# Patient Record
Sex: FEMALE | Race: WHITE | NOT HISPANIC OR LATINO | Employment: OTHER | URBAN - METROPOLITAN AREA
[De-identification: names, ages, dates, MRNs, and addresses within clinical notes are randomized per-mention and may not be internally consistent; named-entity substitution may affect disease eponyms.]

---

## 2022-03-07 ENCOUNTER — OFFICE VISIT (OUTPATIENT)
Dept: FAMILY MEDICINE CLINIC | Facility: CLINIC | Age: 62
End: 2022-03-07

## 2022-03-07 ENCOUNTER — PATIENT ROUNDING (BHMG ONLY) (OUTPATIENT)
Dept: FAMILY MEDICINE CLINIC | Facility: CLINIC | Age: 62
End: 2022-03-07

## 2022-03-07 VITALS
HEIGHT: 65 IN | WEIGHT: 198.8 LBS | OXYGEN SATURATION: 96 % | DIASTOLIC BLOOD PRESSURE: 100 MMHG | RESPIRATION RATE: 16 BRPM | HEART RATE: 88 BPM | TEMPERATURE: 97.1 F | SYSTOLIC BLOOD PRESSURE: 150 MMHG | BODY MASS INDEX: 33.12 KG/M2

## 2022-03-07 DIAGNOSIS — Z86.69 HISTORY OF STATUS EPILEPTICUS: Primary | ICD-10-CM

## 2022-03-07 DIAGNOSIS — I10 PRIMARY HYPERTENSION: ICD-10-CM

## 2022-03-07 DIAGNOSIS — F41.8 DEPRESSION WITH ANXIETY: ICD-10-CM

## 2022-03-07 DIAGNOSIS — Z12.31 ENCOUNTER FOR SCREENING MAMMOGRAM FOR MALIGNANT NEOPLASM OF BREAST: ICD-10-CM

## 2022-03-07 DIAGNOSIS — F10.10 ALCOHOL ABUSE: ICD-10-CM

## 2022-03-07 DIAGNOSIS — G40.909 SEIZURE DISORDER: ICD-10-CM

## 2022-03-07 PROBLEM — R41.89 COGNITIVE IMPAIRMENT: Status: ACTIVE | Noted: 2022-02-12

## 2022-03-07 PROBLEM — F33.9 EPISODE OF RECURRENT MAJOR DEPRESSIVE DISORDER: Status: ACTIVE | Noted: 2022-02-12

## 2022-03-07 PROBLEM — F41.9 ANXIETY: Status: ACTIVE | Noted: 2022-02-18

## 2022-03-07 PROBLEM — F32.A DEPRESSION: Status: ACTIVE | Noted: 2022-02-18

## 2022-03-07 PROCEDURE — 99204 OFFICE O/P NEW MOD 45 MIN: CPT | Performed by: NURSE PRACTITIONER

## 2022-03-07 RX ORDER — MULTIPLE VITAMINS W/ MINERALS TAB 9MG-400MCG
1 TAB ORAL DAILY
COMMUNITY
Start: 2022-02-19 | End: 2022-04-04

## 2022-03-07 RX ORDER — LOSARTAN POTASSIUM 50 MG/1
50 TABLET ORAL DAILY
Qty: 30 TABLET | Refills: 1 | Status: SHIPPED | OUTPATIENT
Start: 2022-03-07 | End: 2022-05-06

## 2022-03-07 RX ORDER — LANOLIN ALCOHOL/MO/W.PET/CERES
3 CREAM (GRAM) TOPICAL
COMMUNITY

## 2022-03-07 RX ORDER — LEVETIRACETAM 750 MG/1
TABLET ORAL
COMMUNITY
Start: 2022-02-18 | End: 2023-02-03 | Stop reason: SDUPTHER

## 2022-03-07 RX ORDER — TRAZODONE HYDROCHLORIDE 50 MG/1
50 TABLET ORAL DAILY
COMMUNITY
Start: 2022-02-18 | End: 2022-04-04 | Stop reason: SDUPTHER

## 2022-03-07 RX ORDER — DULOXETIN HYDROCHLORIDE 30 MG/1
90 CAPSULE, DELAYED RELEASE ORAL DAILY
COMMUNITY
Start: 2022-02-19 | End: 2022-04-04 | Stop reason: SDUPTHER

## 2022-03-07 NOTE — EXTERNAL PATIENT INSTRUCTIONS
Patient Education   Table of Contents       Alcohol Abuse and Dependence Information, Adult       Alcohol Abuse and Nutrition       Alcohol Use Disorder       Epilepsy       How to Take Your Blood Pressure       Hypertension, Adult       Managing Anxiety, Adult       Managing Your Hypertension       Preventing Hypertension       Seizure, Adult     To view videos and all your education online visit,   [email or web address]   or scan this QR code with your smartphone.                  Alcohol Abuse and Dependence Information, Adult     Alcohol is a widely available drug. People drink alcohol in different amounts. People who drink alcohol very often and in large amounts often have problems during and after drinking. They may develop what is called an alcohol use disorder. There are two main types of alcohol use disorders:       Alcohol abuse. This is when you use alcohol too much or too often. You may use alcohol to make yourself feel happy or to reduce stress. You may have a hard time setting a limit on the amount you drink.       Alcohol dependence. This is when you use alcohol consistently for a period of time, and your body changes as a result. This can make it hard to stop drinking because you may start to feel sick or feel different when you do not use alcohol. These symptoms are known as withdrawal.     How can alcohol abuse and dependence affect me?    Alcohol abuse and dependence can have a negative effect on your life. Drinking too much can lead to addiction. You may feel like you need alcohol to function normally. You may drink alcohol before work in the morning, during the day, or as soon as you get home from work in the evening. These actions can result in:       Poor work performance.       Job loss.       Financial problems.       Car crashes or criminal charges from driving after drinking alcohol.       Problems in your relationships with friends and family.       Losing the trust and respect  of coworkers, friends, and family.      Drinking heavily over a long period of time can permanently damage your body and brain, and can cause lifelong health issues, such as:       Damage to your liver or pancreas.       Heart problems, high blood pressure, or stroke.       Certain cancers.       Decreased ability to fight infections.       Brain or nerve damage.       Depression.       Early (premature) death.     If you are careless or you crave alcohol, it is easy to drink more than your body can handle (overdose). Alcohol overdose is a serious situation that requires hospitalization. It may lead to permanent injuries or death.   What can increase my risk?         Having a family history of alcohol abuse.       Having depression or other mental health conditions.       Beginning to drink at an early age.       Binge drinking often.       Experiencing trauma, stress, and an unstable home life during childhood.       Spending time with people who drink often.     What actions can I take to prevent or manage alcohol abuse and dependence?        Do not  drink alcohol if:       Your health care provider tells you not to drink.       You are pregnant, may be pregnant, or are planning to become pregnant.      If you drink alcohol:      Limit how much you use to:       0?1 drink a day for women.       0?2 drinks a day for men.       Be aware of how much alcohol is in your drink. In the [place] one drink equals one 12 oz bottle of beer (355 mL), one 5 oz glass of wine (148 mL), or one 1? oz glass of hard liquor (44 mL).       Stop drinking if you have been drinking too much. This can be very hard to do if you are used to abusing alcohol. If you begin to have withdrawal symptoms, talk with your health care provider or a person that you trust. These symptoms may include anxiety, shaky hands, headache, nausea, sweating, or not being able to sleep.       Choose to drink nonalcoholic beverages in social gatherings and places  where there may be alcohol.     Activity         Spend more time on activities that you enjoy that do not involve alcohol, like hobbies or exercise.       Find healthy ways to cope with stress, such as exercise, meditation, or spending time with people you care about.     General information         Talk to your family, coworkers, and friends about supporting you in your efforts to stop drinking. If they drink, ask them not to drink around you. Spend more time with people who do not drink alcohol.      If you think that you have an alcohol dependency problem:       Tell friends or family about your concerns.       Talk with your health care provider or another health professional about where to get help.       Work with a therapist and a chemical dependency counselor.       Consider joining a support group for people who struggle with alcohol abuse and dependence.     Where to find support            Your health care provider.       [employer]: www.smartrecStringbike.org       Therapy and support groups         Local treatment centers or chemical dependency counselors.       Local AA groups in your community: www.aa.org         Where to find more information         Centers for Disease Control and Prevention: www.cdc.gov         National [place] on Alcohol Abuse and Alcoholism: www.niaaa.nih.gov         Alcoholics Anonymous (AA): www.aa.org       Contact a health care provider if:         You drank more or for longer than you intended on more than one occasion.       You tried to stop drinking or to cut back on how much you drink, but you were not able to.       You often drink to the point of vomiting or passing out.       You want to drink so badly that you cannot think about anything else.       You have problems in your life due to drinking, but you continue to drink.       You keep drinking even though you feel anxious, depressed, or have experienced memory loss.       You have stopped doing the things you  used to enjoy in order to drink.       You have to drink more than you used to in order to get the effect you want.       You experience anxiety, sweating, nausea, shakiness, and trouble sleeping when you try to stop drinking.     Get help right away if:         You have thoughts about hurting yourself or others.      You have serious withdrawal symptoms, including:       Confusion.       Racing heart.       High blood pressure.       Fever.     If you ever feel like you may hurt yourself or others, or have thoughts about taking your own life, get help right away. You can go to your nearest emergency department or call:      Your local emergency services (911 in the [place]      A suicide crisis helpline, such as the National Suicide Prevention Lifeline at 1-318.237.8391. This is open 24 hours a day.     Summary         Alcohol abuse and dependence can have a negative effect on your life. Drinking too much or too often can lead to addiction.       If you drink alcohol, limit how much you use.       If you are having trouble keeping your drinking under control, find ways to change your behavior. Hobbies, calming activities, exercise, or support groups can help.       If you feel you need help with changing your drinking habits, talk with your health care provider, a good friend, or a therapist, or go to an AA group.     This information is not intended to replace advice given to you by your health care provider. Make sure you discuss any questions you have with your health care provider.     Document Released: 12/12/2017Document Revised: 04/07/2020Document Reviewed: 02/25/2020     [employer] Patient Education ? 2021 [facility] [employer]         Alcohol Abuse and Nutrition     Alcohol abuse is any pattern of alcohol consumption that harms your health, relationships, or work. Alcohol abuse can cause poor nutrition (malnutrition or malnourishment) and a lack of nutrients (nutrient deficiencies), which can lead to more  complications. Alcohol abuse brings malnutrition and nutrient deficiencies in two ways:       It causes your liver to work abnormally. This affects how your body divides (breaks down) and absorbs nutrients from food.       It causes you to eat poorly. Many people who abuse alcohol do not eat enough carbohydrates, protein, fat, vitamins, and minerals.      Nutrients that are commonly lacking (deficient) in people who abuse alcohol include:      Vitamins.       Vitamin A. This is needed for your vision, metabolism, and ability to fight off infections (immunity).       B vitamins. These include folate, thiamine, and niacin. These are needed for new cell growth.       Vitamin C. This plays an important role in wound healing, immunity, and helping your body to absorb iron.       Vitamin D. This is necessary for your body to absorb and use calcium. It is produced by your liver, but you can also get it from food and from sun exposure.      Minerals.       Calcium. This is needed for healthy bones as well as heart and blood vessel (cardiovascular) function.       Iron. This is important for blood, muscle, and nervous system functioning.       Magnesium. This plays an important role in muscle and nerve function, and it helps to control blood sugar and blood pressure.       Zinc. This is important for the normal functioning of your nervous system and digestive system (gastrointestinal tract).     If you think that you have an alcohol dependency problem, or if it is hard to stop drinking because you feel sick or different when you do not use alcohol, talk with your health care provider or another health professional about where to get help.   Nutrition is an essential factor in therapy for alcohol abuse. Your health care provider or diet and nutrition specialist (dietitian) will work with you to design a plan that can help to restore nutrients to your body and prevent the risk of complications.   What is my plan?       Your  dietitian may develop a specific eating plan that is based on your condition and any other problems that you have. An eating plan will commonly include:      A balanced diet.       Grains: 6?8 oz (170?227 g) a day. Examples of 1 oz of whole grains include 1 cup of whole-wheat cereal, ? cup of brown rice, or 1 slice of whole-wheat bread.       Vegetables: 2?3 cups a day. Examples of 1 cup of vegetables include 2 medium carrots, 1 large tomato, or 2 stalks of celery.       Fruits: 1?2 cups a day. Examples of 1 cup of fruit include 1 large banana, 1 small apple, 8 large strawberries, or 1 large orange.      Meat and other protein: 5?6 oz (142?170 g) a day.       A cut of meat or fish that is the size of a deck of cards is about 3?4 oz.       Foods that provide 1 oz of protein include 1 egg, ? cup of nuts or seeds, or 1 tablespoon (16 g) of peanut butter.           Dairy: 2?3 cups a day. Examples of 1 cup of dairy include 8 oz (230 mL) of milk, 8 oz (230 g) of yogurt, or 1? oz (44 g) of natural cheese.         Vitamin and mineral supplements.     What are tips for following this plan?         Eat frequent meals and snacks. Try to eat 5?6 small meals each day.       Take vitamin or mineral supplements as recommended by your dietitian.      If you are malnourished or if your dietitian recommends it:      You may follow a high-protein, high-calorie diet. This may include:       2,000?3,000 calories (kilocalories) a day.       70?100 g (grams) of protein a day.           You may be directed to follow a diet that includes a complete nutritional supplement beverage. This can help to restore calories, protein, and vitamins to your body. Depending on your condition, you may be advised to consume this beverage instead of your meals or in addition to them.         Certain medicines may cause changes in your appetite, taste, and weight. Work with your health care provider and dietitian to make any changes to your medicines and  eating plan.       If you are unable to take in enough food and calories by mouth, your health care provider may recommend a feeding tube. This tube delivers nutritional supplements directly to your stomach.   Recommended foods         Eat foods that are high in molecules that prevent oxygen from reacting with your food (antioxidants). These foods include grapes, berries, nuts, green tea, and dark green or orange vegetables. Eating these can help to prevent some of the stress that is placed on your liver by consuming alcohol.       Eat a variety of fresh fruits and vegetables each day. This will help you to get fiber and vitamins in your diet.       Drink plenty of water and other clear fluids, such as apple juice and broth. Try to drink at least 48?64 oz (1.5?2 L) of water a day.       Include foods fortified with vitamins and minerals in your diet. Commonly fortified foods include milk, orange juice, cereal, and bread.       Eat a variety of foods that are high in omega-3 and omega-6 fatty acids. These include fish, nuts and seeds, and soybeans. These foods may help your liver to recover and may also stabilize your mood.      If you are a vegetarian:       Eat a variety of protein-rich foods.       Pair whole grains with plant-based proteins at meals and snack time. For example, eat rice with beans, put peanut butter on whole-grain toast, or eat oatmeal with sunflower seeds.     The items listed above may not be a complete list of foods and beverages you can eat. Contact a dietitian for more information.   Foods to avoid         Avoid foods and drinks that are high in fat and sugar. Sugary drinks, salty snacks, and candy contain empty calories. This means that they lack important nutrients such as protein, fiber, and vitamins.       Avoid alcohol. This is the best way to avoid malnutrition due to alcohol abuse. If you must drink, drink measured amounts. Measured drinking means limiting your intake to no more than  1 drink a day for nonpregnant women and 2 drinks a day for men. One drink equals 12 oz (355 mL) of beer, 5 oz (148 mL) of wine, or 1? oz (44 mL) of hard liquor.       Limit your intake of caffeine. Replace drinks like coffee and black tea with decaffeinated coffee and decaffeinated herbal tea.     The items listed above may not be a complete list of foods and beverages you should avoid. Contact a dietitian for more information.   Summary         Alcohol abuse can cause poor nutrition (malnutrition or malnourishment) and a lack of nutrients (nutrient deficiencies), which can lead to more health problems.       Common nutrient deficiencies include vitamin deficiencies (A, B, C, and D) and mineral deficiencies (calcium, iron, magnesium, and zinc).       Nutrition is an essential factor in therapy for alcohol abuse.       Your health care provider and dietitian can help you to develop a specific eating plan that includes a balanced diet plus vitamin and mineral supplements.     This information is not intended to replace advice given to you by your health care provider. Make sure you discuss any questions you have with your health care provider.     Document Released: 10/12/2006Document Revised: 04/07/2020Document Reviewed: 09/04/2018     [employer] Patient Education ? 2021 [employer]         Alcohol Use Disorder     Alcohol use disorder is a condition in which drinking disrupts daily life. People with this condition drink too much alcohol and cannot control their drinking.   Alcohol use disorder can cause serious problems with physical health. It can affect the brain, heart, and other internal organs. This disorder can raise the risk for certain cancers and cause problems with mental health, such as depression or anxiety.   What are the causes?   This condition is caused by drinking too much alcohol over time. Some people with this condition drink to cope with or escape from negative life events. Others drink to  relieve pain or symptoms of mental illness.   What increases the risk?    You are more likely to develop this condition if:       You have a family history of alcohol use disorder.       Your culture encourages drinking to the point of becoming drunk (intoxication).       You had a mood or conduct disorder in childhood.       You have been abused.      You are an adolescent and you:       Have poor performance in school.       Have poor supervision or guidance.       Act on impulse and like taking risks.     What are the signs or symptoms?    Symptoms of this condition include:       Drinking more than you want to.       Trying several times without success to drink less.       Spending a lot of time thinking about alcohol, getting alcohol, drinking, or recovering from drinking.       Continuing to drink even when it is causing serious problems in your daily life.       Drinking when it is dangerous to drink, such as before driving a car.       Needing more and more alcohol to get the same effect you want (building up tolerance).      Having symptoms of withdrawal when you stop drinking. Withdrawal symptoms may include:       Trouble sleeping, leading to tiredness (fatigue).       Mood swings of depression and anxiety.       Physical symptoms, such as a fast heart rate, rapid breathing, high blood pressure (hypertension), fever, cold sweats, or nausea.       Seizures.       Severe confusion.       Feeling or seeing things that are not there (hallucinations).       Shaking movements that you cannot control (tremors).     How is this diagnosed?   This condition is diagnosed with an assessment. Your health care provider may start by asking three or four questions about your drinking, or he or she may give you a simple test to take. This helps to get clear information from you.   You may also have a physical exam or lab tests. You may be referred to a substance abuse counselor.   How is this treated?       With  education, some people with alcohol use disorder are able to reduce their drinking. Many with this disorder cannot change their drinking behavior on their own and need help from substance use specialists. These specialists are counselors who can help diagnose how severe your disorder is and what type of treatment you need. Treatments may include:       Detoxification. Detoxification involves quitting drinking with supervision and direction of health care providers. Your health care provider may prescribe prescription medicines within the first week to help lessen withdrawal symptoms. Alcohol withdrawal can be dangerous and life-threatening. Detoxification may be provided in a home, community, or primary care setting, or in a hospital or substance use treatment facility.      Counseling. This may involve motivational interviewing (MI), family therapy, or cognitive behavioral therapy (CBT). It is provided by substance use treatment counselors or professional therapists. A counselor can address the things you can do to change your drinking behavior and how to maintain the changes. Talk therapy aims to:       Identify your positive motivations to change.       Identify and avoid the things that trigger your drinking.       Help you learn how to plan your behavior change.       Develop support systems that can help you sustain the change.      Medicines. Medicines can help treat this disorder by:       Decreasing cravings.       Decreasing the positive feeling you have when you drink.       Causing an uncomfortable physical reaction when you drink (aversion therapy).       [place] help groups such as Alcoholics Anonymous (AA). These groups are led by people who have quit drinking. The groups provide emotional support, advice, and guidance.     Some people with this condition benefit from a combination of treatments provided by specialized substance use treatment centers.     Follow these instructions at home:       Medicines         Take over-the-counter and prescription medicines only as told by your health care provider.       Ask before starting any new medicines, herbs, or supplements.     General instructions         Ask friends and family members to support your choice to stay sober.       Avoid situations where alcohol is served.       Create a plan to deal with tempting situations.       Attend support groups regularly.       Practice hobbies or activities you enjoy.      Do not  drink and drive.       Keep all follow-up visits as told by your health care provider. This is important.       How is this prevented?        If you drink alcohol:      Limit how much you use to:       0?1 drink a day for nonpregnant women.       0?2 drinks a day for men.           Be aware of how much alcohol is in your drink. In the [place] one drink equals one 12 oz bottle of beer (355 mL), one 5 oz glass of wine (148 mL), or one 1? oz glass of hard liquor (44 mL).        If you have a mental health condition, seek treatment. Develop a healthy lifestyle through:       Meditation or deep breathing.       Exercise.       Spending time in nature.       Listening to music.       Talking with a trusted friend or family member.      If you are an adolescent:      Do not  drink alcohol. Avoid gatherings where you might be tempted.      Do not  be afraid to say no if someone offers you alcohol. Speak up about why you do not want to drink. Set a positive example for others around you by not drinking.       Build relationships with friends who do not drink.   Where to find more information         Substance Abuse and Mental Health Services Administration: samhsa.gov         Alcoholics Anonymous: aa.org       Contact a health care provider if:         You cannot take your medicines as told.       Your symptoms get worse or you experience symptoms of withdrawal when you stop drinking.       You start drinking again (relapse) and your symptoms get  worse.     Get help right away if:         You have thoughts about hurting yourself or others.     If you ever feel like you may hurt yourself or others, or have thoughts about taking your own life, get help right away. Go to your nearest emergency department or:      Call your local emergency services (925 in the [place]      Call a suicide crisis helpline, such as the National Suicide Prevention Lifeline at 1-933.229.5421. This is open 24 hours a day in the [place]      Text the Crisis Text Line at 489557 (in the [place]     Summary         Alcohol use disorder is a condition in which drinking disrupts daily life. People with this condition drink too much alcohol and cannot control their drinking.       Treatment may include detoxification, counseling, medicines, and support groups.       Ask friends and family members to support you. Avoid situations where alcohol is served.       Get help right away if you have thoughts about hurting yourself or others.     This information is not intended to replace advice given to you by your health care provider. Make sure you discuss any questions you have with your health care provider.     Document Released: 01/25/2006Document Revised: 11/05/2020Document Reviewed: 11/05/2020     [employer] Patient Education ? 2021 [employer]         Epilepsy     Epilepsy is a condition in which a person has repeated seizures over time. A seizure is a sudden burst of abnormal electrical and chemical activity in the brain. Seizures can cause a change in attention, behavior, or ability to remain awake and alert (altered mental status).    Epilepsy increases a person's risk of falls, accidents, and injury. It can also lead to:       Depression.       Poor memory.       Sudden unexplained death in epilepsy (SUDEP). This is rare, and its cause is not known.     Most people with epilepsy lead normal lives.   What are the causes?    This condition may be caused by:       A head injury or injury that  happens at birth.       A high fever during childhood.       A stroke.       Bleeding into or around the brain.       Certain medicines and drugs.       Having too little oxygen for a long period of time.       Abnormal brain development.      Certain conditions. These may include:       Brain infection.       Brain tumor.       Conditions that are passed from parent to child (are hereditary).     Many times, the cause of this condition is not known.   What are the signs or symptoms?    Symptoms of a seizure vary greatly from person to person. They may include:       Uncontrollable shaking (convulsions) with fast, jerking movements of the arms or legs.       Stiffening of the body.       Breathing problems.       Confusion, staring, or unresponsiveness.       Head nodding, eye blinking or fluttering, or rapid eye movements.       Drooling, grunting, or making clicking sounds with your mouth.       Loss of bladder control and bowel control.      Some people have symptoms right before a seizure happens (aura) and right after a seizure happens. Symptoms of an aura include:       Fear or anxiety.       Nausea.      Vertigo. This is a feeling like:       You are moving when you are not.       Your surroundings are moving when they are not.       D?jà vu. This is a feeling of having seen or heard something before.       Odd tastes or smells.       Changes in vision, such as seeing flashing lights or spots.      Symptoms that follow a seizure include:       Confusion.       Sleepiness.       Headache.       Sore muscles.     How is this diagnosed?    This condition is diagnosed based on:       Your symptoms.       Your medical history.       A physical exam.       A neurological exam. This includes checking your strength, reflexes, coordination, and sensations.      Tests. These may include:       A painless test that records your brain waves (electroencephalogram, orEEG).       MRI.       CT scan.       A test of your  spinal fluid (lumbar puncture, or spinal tap).       Blood tests to check for signs of infection or abnormal blood chemistry.     How is this treated?   Treatment can control seizures. Some types of epilepsy will need lifelong treatment, and some types go away in time.    This condition may be treated with:       Medicines to control seizures and prevent future seizures.       A vagus nerve stimulator. This is a device that is implanted in the chest. The device sends electrical impulses to the vagus nerve and to the brain to prevent seizures. This treatment may be recommended if medicines do not help.       Brain surgery. There are several kinds of surgeries that may be done to stop seizures from happening or to reduce how often seizures happen.       Blood tests. You may need to have blood tests regularly to check that you are getting the right amount of medicine.       The ketogenic diet. This diet involves foods that are low in carbohydrates and high in fat.     When this condition has been diagnosed, it is important to begin treatment as soon as possible. For some people, epilepsy goes away in time.   Follow these instructions at home:   Medicines         Take over-the-counter and prescription medicines only as told by your health care provider.       Avoid any substances that may prevent your medicine from working properly, such as alcohol.     Activity         Get enough rest. Lack of sleep can make seizures more likely to happen.       Follow instructions from your health care provider about driving, swimming, and doing any other activities that would be dangerous if you had a seizure. If you live in the [place] check with your local department of motor vehicles (DMV) to find out about local driving laws. Each state has specific rules about when you can legally start driving again.     Educating others           Teach friends and family what to do if you have a seizure. They should:       Help you get down to  the ground to prevent a fall.       Cushion your head and body.       Loosen any tight clothing around your neck.       Turn you on your side. If vomiting occurs, this helps keep your airway clear.       Not hold you down. Holding you down will not stop the seizure.       Not put anything in your mouth.       Stay with you until you recover.       Know whether or not you need emergency care.     General instructions         Avoid anything that has ever triggered a seizure for you.       Keep a seizure diary. Record what you remember about each seizure, especially anything that might have triggered the seizure.       Keep all follow-up visits. This is important.       Where to find more information         Epilepsy Foundation: epilepsy.com         International League Against Epilepsy: ilae.org       Contact a health care provider if:         Your seizure pattern changes.       You continue to have seizures with treatment.       You have symptoms of an infection or illness. Either of these might increase your risk of having a seizure.       You are unable to take your medicine.     Get help right away if:        You have:       A seizure that does not stop after 5 minutes.       Several seizures in a row without a complete recovery between seizures.       A seizure that makes it harder to breathe.       A seizure that leaves you unable to speak or use a part of your body.       You did not wake up right away after a seizure.       You injure yourself during a seizure.       You have confusion or pain right after a seizure.     These symptoms may represent a serious problem that is an emergency. Do not wait to see if the symptoms will go away. Get medical help right away. Call your local emergency services (911 in the [place] Do not drive yourself to the hospital.   If you ever feel like you may hurt yourself or others, or have thoughts about taking your own life, get help right away. Go to your nearest emergency  department or:      Call your local emergency services (911 in the [place]      Call a suicide crisis helpline, such as the National Suicide Prevention Lifeline at 1-473.156.1973. This is open 24 hours a day in the [place]      Text the Crisis Text Line at 478448 (in the [place]     Summary         Epilepsy is a condition in which a person has repeated seizures over time. Some types of epilepsy will need lifelong treatment, and some types go away in time.       Seizures can cause many symptoms, such as brief staring and uncontrollable shaking or fast movements of the arms or legs.       Treatment can control seizures. Take over-the-counter and prescription medicines only as told by your health care provider.       Follow instructions from your health care provider about driving, swimming, and doing any other activities that would be dangerous if you had a seizure.       Teach friends and family what to do if you have a seizure.     This information is not intended to replace advice given to you by your health care provider. Make sure you discuss any questions you have with your health care provider.     Document Released: 12/18/2006Document Revised: 06/21/2021Document Reviewed: 06/21/2021     [employer] Patient Education ? 2021 [employer]         How to Take Your Blood Pressure     Blood pressure is a measurement of how strongly your blood is pressing against the walls of your arteries. Arteries are blood vessels that carry blood from your heart throughout your body. Your health care provider takes your blood pressure at each office visit. You can also take your own blood pressure at home with a blood pressure monitor.    You may need to take your own blood pressure to:       Confirm a diagnosis of high blood pressure (hypertension).       Monitor your blood pressure over time.       Make sure your blood pressure medicine is working.     Supplies needed:         Blood pressure monitor.       Dining room chair to sit in.  "      Table or desk.       Small notebook and pencil or pen.     How to prepare    To get the most accurate reading, avoid the following for 30 minutes before you check your blood pressure:       Drinking caffeine.       Drinking alcohol.       Eating.       Smoking.       Exercising.      Five minutes before you check your blood pressure:       Use the bathroom and urinate so that you have an empty bladder.       Sit quietly in a dining room chair. Do not  sit in a soft couch or an armchair. Do not  talk.     How to take your blood pressure       To check your blood pressure, follow the instructions in the manual that came with your blood pressure monitor. If you have a digital blood pressure monitor, the instructions may be as follows:     Sit up straight in a chair.       Place your feet on the floor. Do not  cross your ankles or legs.       Rest your left arm at the level of your heart on a table or desk or on the arm of a chair.       Pull up your shirt sleeve.       Wrap the blood pressure cuff around the upper part of your left arm, 1 inch (2.5 cm) above your elbow. It is best to wrap the cuff around bare skin.       Fit the cuff snugly around your arm. You should be able to place only one finger between the cuff and your arm.       Position the cord so that it rests in the bend of your elbow.       Press the power button.       Sit quietly while the cuff inflates and deflates.       Read the digital reading on the monitor screen and write the numbers down (record them) in a notebook.       Wait 2?3 minutes, then repeat the steps, starting at step 1.     What does my blood pressure reading mean?   A blood pressure reading consists of a higher number over a lower number. Ideally, your blood pressure should be below 120/80. The first (\"top\") number is called the systolic pressure. It is a measure of the pressure in your arteries as your heart beats. The second (\"bottom\") number is called the diastolic pressure. " It is a measure of the pressure in your arteries as the heart relaxes.   Blood pressure is classified into five stages. The following are the stages for adults who do not have a short-term serious illness or a chronic condition. Systolic pressure and diastolic pressure are measured in a unit called mm Hg (millimeters of mercury).   Normal         Systolic pressure: below 120.       Diastolic pressure: below 80.     Elevated         Systolic pressure: 120?129.       Diastolic pressure: below 80.     Hypertension stage 1         Systolic pressure: 130?139.       Diastolic pressure: 80?89.     Hypertension stage 2         Systolic pressure: 140 or above.       Diastolic pressure: 90 or above.     You can have elevated blood pressure or hypertension even if only the systolic or only the diastolic number in your reading is higher than normal.   Follow these instructions at home:         Check your blood pressure as often as recommended by your health care provider.       Check your blood pressure at the same time every day.      Take your monitor to the next appointment with your health care provider to make sure that:       You are using it correctly.       It provides accurate readings.       Be sure you understand what your goal blood pressure numbers are.       Tell your health care provider if you are having any side effects from blood pressure medicine.       Keep all follow-up visits as told by your health care provider. This is important.     General tips         Your health care provider can suggest a reliable monitor that will meet your needs. There are several types of home blood pressure monitors.       Choose a monitor that has an arm cuff. Do not  choose a monitor that measures your blood pressure from your wrist or finger.       Choose a cuff that wraps snugly around your upper arm. You should be able to fit only one finger between your arm and the cuff.       You can buy a blood pressure monitor at most  drugstores or online.     Where to find more information   American Heart Association:   www.heart.org     Contact a health care provider if:         Your blood pressure is consistently high.     Get help right away if:         Your systolic blood pressure is higher than 180.       Your diastolic blood pressure is higher than 120.     Summary         Blood pressure is a measurement of how strongly your blood is pressing against the walls of your arteries.       A blood pressure reading consists of a higher number over a lower number. Ideally, your blood pressure should be below 120/80.       Check your blood pressure at the same time every day.       Avoid caffeine, alcohol, smoking, and exercise for 30 minutes prior to checking your blood pressure. These agents can affect the accuracy of the blood pressure reading.     This information is not intended to replace advice given to you by your health care provider. Make sure you discuss any questions you have with your health care provider.     Document Released: 05/26/2017Document Revised: 12/11/2020Document Reviewed: 12/11/2020     [email or web address] Patient Education ? 2021 [employer]         Hypertension, Adult     Hypertension is another name for high blood pressure. High blood pressure forces your heart to work harder to pump blood. This can cause problems over time.   There are two numbers in a blood pressure reading. There is a top number (systolic) over a bottom number (diastolic). It is best to have a blood pressure that is below 120/80. Healthy choices can help lower your blood pressure, or you may need medicine to help lower it.   What are the causes?   The cause of this condition is not known. Some conditions may be related to high blood pressure.   What increases the risk?         Smoking.       Having type 2 diabetes mellitus, high cholesterol, or both.       Not getting enough exercise or physical activity.       Being overweight.       Having too much fat,  sugar, calories, or salt (sodium) in your diet.       Drinking too much alcohol.       Having long-term (chronic) kidney disease.       Having a family history of high blood pressure.       Age. Risk increases with age.       Race. You may be at higher risk if you are .       Gender. Men are at higher risk than women before age 45. After age 65, women are at higher risk than men.       Having obstructive sleep apnea.       Stress.     What are the signs or symptoms?        High blood pressure may not cause symptoms. Very high blood pressure (hypertensive crisis) may cause:       Headache.       Feelings of worry or nervousness (anxiety).       Shortness of breath.       Nosebleed.       A feeling of being sick to your stomach (nausea).       Throwing up (vomiting).       Changes in how you see.       Very bad chest pain.       Seizures.     How is this treated?        This condition is treated by making healthy lifestyle changes, such as:       Eating healthy foods.       Exercising more.       Drinking less alcohol.      Your health care provider may prescribe medicine if lifestyle changes are not enough to get your blood pressure under control, and if:       Your top number is above 130.       Your bottom number is above 80.       Your personal target blood pressure may vary.     Follow these instructions at home:   Eating and drinking           If told, follow the DASH eating plan. To follow this plan:       Fill one half of your plate at each meal with fruits and vegetables.       Fill one fourth of your plate at each meal with whole grains. Whole grains include whole-wheat pasta, brown rice, and whole-grain bread.       Eat or drink low-fat dairy products, such as skim milk or low-fat yogurt.       Fill one fourth of your plate at each meal with low-fat (lean) proteins. Low-fat proteins include fish, chicken without skin, eggs, beans, and tofu.       Avoid fatty meat, cured and processed meat,  or chicken with skin.       Avoid pre-made or processed food.       Eat less than 1,500 mg of salt each day.      Do not  drink alcohol if:       Your doctor tells you not to drink.       You are pregnant, may be pregnant, or are planning to become pregnant.      If you drink alcohol:      Limit how much you use to:       0?1 drink a day for women.       0?2 drinks a day for men.       Be aware of how much alcohol is in your drink. In the [place] one drink equals one 12 oz bottle of beer (355 mL), one 5 oz glass of wine (148 mL), or one 1? oz glass of hard liquor (44 mL).     Lifestyle            Work with your doctor to stay at a healthy weight or to lose weight. Ask your doctor what the best weight is for you.       Get at least 30 minutes of exercise most days of the week. This may include walking, swimming, or biking.       Get at least 30 minutes of exercise that strengthens your muscles (resistance exercise) at least 3 days a week. This may include lifting weights or doing Pilates.      Do not  use any products that contain nicotine or tobacco, such as cigarettes, e-cigarettes, and chewing tobacco. If you need help quitting, ask your doctor.       Check your blood pressure at home as told by your doctor.       Keep all follow-up visits as told by your doctor. This is important.       Medicines         Take over-the-counter and prescription medicines only as told by your doctor. Follow directions carefully.      Do not  skip doses of blood pressure medicine. The medicine does not work as well if you skip doses. Skipping doses also puts you at risk for problems.       Ask your doctor about side effects or reactions to medicines that you should watch for.       Contact a doctor if you:         Think you are having a reaction to the medicine you are taking.       Have headaches that keep coming back (recurring).       Feel dizzy.       Have swelling in your ankles.       Have trouble with your vision.     Get help  right away if you:         Get a very bad headache.       Start to feel mixed up (confused).       Feel weak or numb.       Feel faint.      Have very bad pain in your:       Chest.       Belly (abdomen).       Throw up more than once.       Have trouble breathing.     Summary         Hypertension is another name for high blood pressure.       High blood pressure forces your heart to work harder to pump blood.       For most people, a normal blood pressure is less than 120/80.       Making healthy choices can help lower blood pressure. If your blood pressure does not get lower with healthy choices, you may need to take medicine.     This information is not intended to replace advice given to you by your health care provider. Make sure you discuss any questions you have with your health care provider.     Document Released: 06/05/2009Document Revised: 08/28/2019Document Reviewed: 08/28/2019     [employer] Patient Education ? 2021 [employer]         Managing Anxiety, Adult     After being diagnosed with an anxiety disorder, you may be relieved to know why you have felt or behaved a certain way. You may also feel overwhelmed about the treatment ahead and what it will mean for your life. With care and support, you can manage this condition and recover from it.   How to manage lifestyle changes   Managing stress and anxiety      Stress is your body's reaction to life changes and events, both good and bad. Most stress will last just a few hours, but stress can be ongoing and can lead to more than just stress. Although stress can play a major role in anxiety, it is not the same as anxiety. Stress is usually caused by something external, such as a deadline, test, or competition. Stress normally passes after the triggering event has ended.   Anxiety is caused by something internal, such as imagining a terrible outcome or worrying that something will go wrong that will devastate you. Anxiety often does not go away even after  the triggering event is over, and it can become long-term (chronic) worry. It is important to understand the differences between stress and anxiety and to manage your stress effectively so that it does not lead to an anxious response.    Talk with your health care provider or a counselor to learn more about reducing anxiety and stress. He or she may suggest tension reduction techniques, such as:       Music therapy. This can include creating or listening to music that you enjoy and that inspires you.       Mindfulness-based meditation. This involves being aware of your normal breaths while not trying to control your breathing. It can be done while sitting or walking.       Centering prayer. This involves focusing on a word, phrase, or sacred image that means something to you and brings you peace.       Deep breathing. To do this, expand your stomach and inhale slowly through your nose. Hold your breath for 3?5 seconds. Then exhale slowly, letting your stomach muscles relax.       Self-talk. This involves identifying thought patterns that lead to anxiety reactions and changing those patterns.       Muscle relaxation. This involves tensing muscles and then relaxing them.      Choose a tension reduction technique that suits your lifestyle and personality. These techniques take time and practice. Set aside 5?15 minutes a day to do them. Therapists can offer counseling and training in these techniques. The training to help with anxiety may be covered by some insurance plans. Other things you can do to manage stress and anxiety include:       Keeping a stress/anxiety diary. This can help you learn what triggers your reaction and then learn ways to manage your response.       Thinking about how you react to certain situations. You may not be able to control everything, but you can control your response.       Making time for activities that help you relax and not feeling guilty about spending your time in this way.        Visual imagery and yoga can help you stay calm and relax.     Medicines    Medicines can help ease symptoms. Medicines for anxiety include:       Anti-anxiety drugs.       Antidepressants.     Medicines are often used as a primary treatment for anxiety disorder. Medicines will be prescribed by a health care provider. When used together, medicines, psychotherapy, and tension reduction techniques may be the most effective treatment.   Relationships   Relationships can play a big part in helping you recover. Try to spend more time connecting with trusted friends and family members. Consider going to couples counseling, taking family education classes, or going to family therapy. Therapy can help you and others better understand your condition.     How to recognize changes in your anxiety    Everyone responds differently to treatment for anxiety. Recovery from anxiety happens when symptoms decrease and stop interfering with your daily activities at home or work. This may mean that you will start to:       Have better concentration and focus. Worry will interfere less in your daily thinking.       Sleep better.       Be less irritable.       Have more energy.       Have improved memory.     It is important to recognize when your condition is getting worse. Contact your health care provider if your symptoms interfere with home or work and you feel like your condition is not improving.   Follow these instructions at home:   Activity        Exercise. Most adults should do the following:       Exercise for at least 150 minutes each week. The exercise should increase your heart rate and make you sweat (moderate-intensity exercise).       Strengthening exercises at least twice a week.       Get the right amount and quality of sleep. Most adults need 7?9 hours of sleep each night.     Lifestyle            Eat a healthy diet that includes plenty of vegetables, fruits, whole grains, low-fat dairy products, and lean protein. Do  not  eat a lot of foods that are high in solid fats, added sugars, or salt.       Make choices that simplify your life.      Do not  use any products that contain nicotine or tobacco, such as cigarettes, e-cigarettes, and chewing tobacco. If you need help quitting, ask your health care provider.       Avoid caffeine, alcohol, and certain over-the-counter cold medicines. These may make you feel worse. Ask your pharmacist which medicines to avoid.     General instructions         Take over-the-counter and prescription medicines only as told by your health care provider.       Keep all follow-up visits as told by your health care provider. This is important.       Where to find support    You can get help and support from these sources:       Self-help groups.       Online and community organizations.       A trusted spiritual leader.       Couples counseling.       Family education classes.       Family therapy.     Where to find more information    You may find that joining a support group helps you deal with your anxiety. The following sources can help you locate counselors or support groups near you:       Mental Health [place]: www.mentalhealthamerica.net         [employer] [place] (ADAA): www.adaa.org         [facility] ([facility]): www.hannah.org       Contact a health care provider if you:         Have a hard time staying focused or finishing daily tasks.       Spend many hours a day feeling worried about everyday life.       Become exhausted by worry.       Start to have headaches, feel tense, or have nausea.       Urinate more than normal.       Have diarrhea.     Get help right away if you have:         A racing heart and shortness of breath.       Thoughts of hurting yourself or others.     If you ever feel like you may hurt yourself or others, or have thoughts about taking your own life, get help right away. You can go to your nearest emergency department or  call:      Your local emergency services (911 in the [place]      A suicide crisis helpline, such as the National Suicide Prevention Lifeline at 1-515.436.4634. This is open 24 hours a day.     Summary         Taking steps to learn and use tension reduction techniques can help calm you and help prevent triggering an anxiety reaction.       When used together, medicines, psychotherapy, and tension reduction techniques may be the most effective treatment.       Family, friends, and partners can play a big part in helping you recover from an anxiety disorder.     This information is not intended to replace advice given to you by your health care provider. Make sure you discuss any questions you have with your health care provider.     Document Released: 12/12/2017Document Revised: 05/19/2020Document Reviewed: 05/19/2020     [employer] Patient Education ? 2021 [employer]         Managing Your Hypertension     Hypertension, also called high blood pressure, is when the force of the blood pressing against the walls of the arteries is too strong. Arteries are blood vessels that carry blood from your heart throughout your body. Hypertension forces the heart to work harder to pump blood and may cause the arteries to become narrow or stiff.   Understanding blood pressure readings    Your personal target blood pressure may vary depending on your medical conditions, your age, and other factors. A blood pressure reading includes a higher number over a lower number. Ideally, your blood pressure should be below 120/80. You should know that:       The first, or top, number is called the systolic pressure. It is a measure of the pressure in your arteries as your heart beats.       The second, or bottom number, is called the diastolic pressure. It is a measure of the pressure in your arteries as the heart relaxes.     Blood pressure is classified into four stages. Based on your blood pressure reading, your health care provider may use  the following stages to determine what type of treatment you need, if any. Systolic pressure and diastolic pressure are measured in a unit called mmHg.   Normal         Systolic pressure: below 120.       Diastolic pressure: below 80.     Elevated         Systolic pressure: 120?129.       Diastolic pressure: below 80.     Hypertension stage 1         Systolic pressure: 130?139.       Diastolic pressure: 80?89.     Hypertension stage 2         Systolic pressure: 140 or above.       Diastolic pressure: 90 or above.     How can this condition affect me?    Managing your hypertension is an important responsibility. Over time, hypertension can damage the arteries and decrease blood flow to important parts of the body, including the brain, heart, and kidneys. Having untreated or uncontrolled hypertension can lead to:       A heart attack.       A stroke.       A weakened blood vessel (aneurysm).       Heart failure.       Kidney damage.       Eye damage.       Metabolic syndrome.       Memory and concentration problems.       Vascular dementia.     What actions can I take to manage this condition?   Hypertension can be managed by making lifestyle changes and possibly by taking medicines. Your health care provider will help you make a plan to bring your blood pressure within a normal range.   Nutrition           Eat a diet that is high in fiber and potassium, and low in salt (sodium), added sugar, and fat. An example eating plan is called the Dietary Approaches to Stop Hypertension (DASH) diet. To eat this way:       Eat plenty of fresh fruits and vegetables. Try to fill one-half of your plate at each meal with fruits and vegetables.       Eat whole grains, such as whole-wheat pasta, brown rice, or whole-grain bread. Fill about one-fourth of your plate with whole grains.       Eat low-fat dairy products.       Avoid fatty cuts of meat, processed or cured meats, and poultry with skin. Fill about one-fourth of your plate  with lean proteins such as fish, chicken without skin, beans, eggs, and tofu.       Avoid pre-made and processed foods. These tend to be higher in sodium, added sugar, and fat.       Reduce your daily sodium intake. Most people with hypertension should eat less than 1,500 mg of sodium a day.     Lifestyle            Work with your health care provider to maintain a healthy body weight or to lose weight. Ask what an ideal weight is for you.       Get at least 30 minutes of exercise that causes your heart to beat faster (aerobic exercise) most days of the week. Activities may include walking, swimming, or biking.       Include exercise to strengthen your muscles (resistance exercise), such as weight lifting, as part of your weekly exercise routine. Try to do these types of exercises for 30 minutes at least 3 days a week.      Do not  use any products that contain nicotine or tobacco, such as cigarettes, e-cigarettes, and chewing tobacco. If you need help quitting, ask your health care provider.       Control any long-term (chronic) conditions you have, such as high cholesterol or diabetes.       Identify your sources of stress and find ways to manage stress. This may include meditation, deep breathing, or making time for fun activities.       Alcohol use        Do not  drink alcohol if:       Your health care provider tells you not to drink.       You are pregnant, may be pregnant, or are planning to become pregnant.      If you drink alcohol:      Limit how much you use to:       0?1 drink a day for women.       0?2 drinks a day for men.       Be aware of how much alcohol is in your drink. In the [place] one drink equals one 12 oz bottle of beer (355 mL), one 5 oz glass of wine (148 mL), or one 1? oz glass of hard liquor (44 mL).     Medicines    Your health care provider may prescribe medicine if lifestyle changes are not enough to get your blood pressure under control and if:       Your systolic blood pressure is 130  or higher.       Your diastolic blood pressure is 80 or higher.     Take medicines only as told by your health care provider. Follow the directions carefully. Blood pressure medicines must be taken as told by your health care provider. The medicine does not work as well when you skip doses. Skipping doses also puts you at risk for problems.   Monitoring       Before you monitor your blood pressure:      Do not  smoke, drink caffeinated beverages, or exercise within 30 minutes before taking a measurement.       Use the bathroom and empty your bladder (urinate).       Sit quietly for at least 5 minutes before taking measurements.      Monitor your blood pressure at home as told by your health care provider. To do this:       Sit with your back straight and supported.       Place your feet flat on the floor. Do not  cross your legs.       Support your arm on a flat surface, such as a table. Make sure your upper arm is at heart level.       Each time you measure, take two or three readings one minute apart and record the results.     You may also need to have your blood pressure checked regularly by your health care provider.     General information         Talk with your health care provider about your diet, exercise habits, and other lifestyle factors that may be contributing to hypertension.       Review all the medicines you take with your health care provider because there may be side effects or interactions.       Keep all visits as told by your health care provider. Your health care provider can help you create and adjust your plan for managing your high blood pressure.       Where to find more information         National Heart, Lung, and Blood [place]: www.nhlbi.nih.gov         American Heart Association: www.heart.org       Contact a health care provider if:         You think you are having a reaction to medicines you have taken.       You have repeated (recurrent) headaches.       You feel dizzy.       You  have swelling in your ankles.       You have trouble with your vision.     Get help right away if:         You develop a severe headache or confusion.       You have unusual weakness or numbness, or you feel faint.       You have severe pain in your chest or abdomen.       You vomit repeatedly.       You have trouble breathing.     These symptoms may represent a serious problem that is an emergency. Do not wait to see if the symptoms will go away. Get medical help right away. Call your local emergency services (911 in the [place] Do not drive yourself to the hospital.   Summary         Hypertension is when the force of blood pumping through your arteries is too strong. If this condition is not controlled, it may put you at risk for serious complications.       Your personal target blood pressure may vary depending on your medical conditions, your age, and other factors. For most people, a normal blood pressure is less than 120/80.       Hypertension is managed by lifestyle changes, medicines, or both.       Lifestyle changes to help manage hypertension include losing weight, eating a healthy, low-sodium diet, exercising more, stopping smoking, and limiting alcohol.     This information is not intended to replace advice given to you by your health care provider. Make sure you discuss any questions you have with your health care provider.     Document Released: 09/11/2013Document Revised: 01/22/2021Document Reviewed: 11/17/2020     [employer] Patient Education ? 2021 [employer]         Preventing Hypertension     Hypertension, also called high blood pressure, is when the force of blood pumping through the arteries is too strong. Arteries are blood vessels that carry blood from the heart throughout the body. Often, hypertension does not cause symptoms until blood pressure is very high. It is important to have your blood pressure checked regularly.   Diet and lifestyle changes can help you prevent hypertension, and they  may make you feel better overall and improve your quality of life. If you already have hypertension, you may control it with diet and lifestyle changes, as well as with medicine.   How can this condition affect me?   Over time, hypertension can damage the arteries and decrease blood flow to important parts of the body, including the brain, heart, and kidneys. By keeping your blood pressure in a healthy range, you can help prevent complications like heart attack, heart failure, stroke, kidney failure, and vascular dementia.   What can increase my risk?         Being an older adult. Older people are more often affected.       Having family members who have had high blood pressure.       Being obese.       Being male. Males are more likely to have high blood pressure.       Drinking too much alcohol or caffeine.       Smoking or using illegal drugs.       Taking certain medicines, such as antidepressants, decongestants, birth control pills, and NSAIDs, such as ibuprofen.       Having thyroid problems.       Having certain tumors.     What actions can I take to prevent or manage this condition?   Work with your health care provider to make a hypertension prevention plan that works for you. Follow your plan and keep all follow-up visits as told by your health care provider.   Diet changes       Maintain a healthy diet. This includes:      Eating less salt (sodium). Ask your health care provider how much sodium is safe for you to have. The general recommendation is to have less than 1 tsp (2,300 mg) of sodium a day.      Do not  add salt to your food.       Choose low-sodium options when grocery shopping and eating out.       Limiting fats in your diet. You can do this by eating low-fat or fat-free dairy products and by eating less red meat.      Eating more fruits, vegetables, and whole grains. Make a goal to eat:       1??2 cups of fresh fruits and vegetables each day.       3?4 servings of whole grains each day.        Avoiding foods and beverages that have added sugars.       Eating fish that contain healthy fats (omega-3 fatty acids), such as mackerel or salmon.     If you need help putting together a healthy eating plan, try the DASH diet. This diet is high in fruits, vegetables, and whole grains. It is low in sodium, red meat, and added sugars. DASH stands for Dietary Approaches to Stop Hypertension.   Lifestyle changes      Lose weight if you are overweight. Losing just 3?5% of your body weight can help prevent or control hypertension. For example, if your present weight is 200 lb (91 kg), a loss of 3?5% of your weight means losing 6?10 lb (2.7?4.5 kg). Ask your health care provider to help you with a diet and exercise plan to safely lose weight.    Other recommendations usually include:       Get enough exercise. Do at least 150 minutes of moderate-intensity exercise each week. You could do this in short exercise sessions several times a day, or you could do longer exercise sessions a few times a week. For example, you could take a brisk 10-minute walk or bike ride, 3 times a day, for 5 days a week.       Find ways to reduce stress, such as exercising, meditating, listening to music, or taking a yoga class. If you need help reducing stress, ask your health care provider.      Do not  use any products that contain nicotine or tobacco, such as cigarettes, e-cigarettes, and chewing tobacco. If you need help quitting, ask your health care provider. Chemicals in tobacco and nicotine products raise your blood pressure each time you use them. If you need help quitting, ask your health care provider.       Learn how to check your blood pressure at home. Make sure that you know your personal target blood pressure, as told by your health care provider.       Try to sleep 7?9 hours per night.       Alcohol use        Do not  drink alcohol if:       Your health care provider tells you not to drink.       You are pregnant, may be  pregnant, or are planning to become pregnant.      If you drink alcohol:      Limit how much you use to:       0?1 drink a day for women.       0?2 drinks a day for men.       Be aware of how much alcohol is in your drink. In the [place] one drink equals one 12 oz bottle of beer (355 mL), one 5 oz glass of wine (148 mL), or one 1? oz glass of hard liquor (44 mL).     Medicines    In addition to diet and lifestyle changes, your health care provider may recommend medicines to help lower your blood pressure. In general:       You may need to try a few different medicines to find what works best for you.       You may need to take more than one medicine.       Take over-the-counter and prescription medicines only as told by your health care provider.       Questions to ask your health care provider         What is my blood pressure goal?       How can I lower my risk for high blood pressure?       How should I monitor my blood pressure at home?     Where to find support   Your health care provider can help you prevent hypertension and help you keep your blood pressure at a healthy level. Your local hospital or your community may also provide support services and prevention programs.   The American Heart Association offers an online support network at   supportnetwork.heart.org     Where to find more information    Learn more about hypertension from:       National Heart, Lung, and Blood [place]: www.nhlbi.nih.gov         Centers for Disease Control and Prevention: www.cdc.gov         American Academy of Family Physicians: familydoctor.org        Learn more about the DASH diet from:       National Heart, Lung, and Blood [place]: www.nhlbi.nih.gov       Contact a health care provider if:         You think you are having a reaction to medicines you have taken.       You have recurrent headaches or feel dizzy.       You have swelling in your ankles.       You have trouble with your vision.     Get help right away if:          You have sudden, severe chest, back, or abdominal pain or discomfort.       You have shortness of breath.       You have a sudden, severe headache.     These symptoms may represent a serious problem that is an emergency. Do not wait to see if the symptoms will go away. Get medical help right away. Call your local emergency services (911 in the [place] Do not drive yourself to the hospital.   Summary         Hypertension often does not cause any symptoms until blood pressure is very high. It is important to get your blood pressure checked regularly.       Diet and lifestyle changes are important steps in preventing hypertension.       By keeping your blood pressure in a healthy range, you may prevent complications like heart attack, heart failure, stroke, and kidney failure.       Work with your health care provider to make a hypertension prevention plan that works for you.     This information is not intended to replace advice given to you by your health care provider. Make sure you discuss any questions you have with your health care provider.     Document Released: 01/01/2017Document Revised: 11/17/2020Document Reviewed: 11/17/2020     [employer] Patient Education ? 2021 [employer]         Seizure, Adult     A seizure is a sudden burst of abnormal electrical and chemical activity in the brain. Seizures usually last from 30 seconds to 2 minutes. The abnormal activity temporarily interrupts normal brain function.   Many types of seizures can affect adults. A seizure can cause many different symptoms depending on where in the brain it starts.   What are the causes?    Common causes of this condition include:       Fever or infection.       Brain injury, head trauma, bleeding in the brain, or a brain tumor.       Low levels of blood sugar or salt (sodium).       Kidney problems or liver problems.       Metabolic disorders or other conditions that are passed from parent to child (are inherited).       Reaction  to a substance, such as a drug or a medicine, or suddenly stopping the use of a substance (withdrawal).       A stroke.       Developmental disorders such as autism spectrum disorder or cerebral palsy.     In some cases, the cause of a seizure may not be known. Some people who have a seizure never have another one. A person who has repeated seizures over time without a clear cause has a condition called epilepsy.   What increases the risk?    You are more likely to develop this condition if:       You have a family history of epilepsy.       You have had a tonic?clonic seizure before. This type of seizure causes tightening (contraction) of the muscles of the whole body and loss of consciousness.       You have a history of head trauma, lack of oxygen at birth, or strokes.     What are the signs or symptoms?   There are many different types of seizures. The symptoms vary depending on the type of seizure you have. Symptoms occur during the seizure. They may also occur before a seizure (aura) and after a seizure (postictal). Symptoms may include the following:   Symptoms during a seizure         Uncontrollable shaking (convulsions) with fast, jerky movements of muscles.       Stiffening of the body.       Breathing problems.       Confusion, staring, or unresponsiveness.       Head nodding, eye blinking or fluttering, or rapid eye movements.       Drooling, grunting, or making clicking sounds with your mouth.       Loss of bladder control and bowel control.     Symptoms before a seizure         Fear or anxiety.       Nausea.      Vertigo. This is a feeling like:       You are moving when you are not.       Your surroundings are moving when they are not.       D?jà vu. This is a feeling of having seen or heard something before.       Odd tastes or smells.       Changes in vision, such as seeing flashing lights or spots.     Symptoms after a seizure         Confusion.       Sleepiness.       Headache.       Sore muscles.      How is this diagnosed?    This condition may be diagnosed based on:       A description of your symptoms. Video of your seizures can be helpful.       Your medical history.       A physical exam.      You may also have tests, including:       Blood tests.       CT scan.       MRI.       Electroencephalogram (EEG). This test measures electrical activity in the brain. An EEG can predict whether seizures will return.       A spinal tap, also called a lumbar puncture. This is the removal and testing of fluid that surrounds the brain and spinal cord.     How is this treated?   Most seizures will stop on their own in less than 5 minutes, and no treatment is needed. Seizures that last longer than 5 minutes will usually need treatment.    Seizures may be treated with:       Medicines given through an IV.       Avoiding known triggers, such as medicines that you take for another condition.       Medicines to control seizures or prevent future seizures (antiepileptics), if epilepsy caused your seizures.       Medical devices to prevent and control seizures.       Surgery to stop seizures or to reduce how often seizures happen, if you have epilepsy that does not respond to medicines.       A diet low in carbohydrates and high in fat (ketogenic diet).     Follow these instructions at home:   Medicines         Take over-the-counter and prescription medicines only as told by your health care provider.       Avoid any substances that may prevent your medicine from working properly, such as alcohol.     Activity         Follow instructions about activities, such as driving or swimming, that would be dangerous if you had another seizure. Wait until your health care provider says it is safe to do them.       If you live in the [place] check with your local department of motor vehicles (DMV) to find out about local driving laws. Each state has specific rules about when you can legally drive again.       Get enough rest. Lack of  sleep can make seizures more likely to occur.     Educating others           Teach friends and family what to do if you have a seizure. They should:       Help you get down to the ground, to prevent a fall.       Cushion your head and move items away from your body.       Loosen any tight clothing around your neck.       Turn you on your side. If you vomit, this helps keep your airway clear.       Know whether or not you need emergency care.       Stay with you until you recover.      Also, tell them what not to do if you have a seizure. Tell them:       They should not hold you down. Holding you down will not stop the seizure.       They should not put anything in your mouth.     General instructions         Avoid anything that has ever triggered a seizure for you.       Keep a seizure diary. Record what you remember about each seizure, especially anything that might have triggered it.       Keep all follow-up visits. This is important.       Contact a health care provider if:         You have another seizure or seizures. Call each time you have a seizure.       Your seizure pattern changes.       You continue to have seizures with treatment.       You have symptoms of an infection or illness. Either of these might increase your risk of having a seizure.       You are unable to take your medicine.     Get help right away if:        You have:       A seizure that does not stop after 5 minutes.       Several seizures in a row without a complete recovery between seizures.       A seizure that makes it harder to breathe.       A seizure that leaves you unable to speak or use a part of your body.       You do not wake up right away after a seizure.       You injure yourself during a seizure.       You have confusion or pain right after a seizure.     These symptoms may represent a serious problem that is an emergency. Do not wait to see if the symptoms will go away. Get medical help right away. Call your local emergency  services (911 in the [place] Do not drive yourself to the hospital.   Summary         Seizures are caused by abnormal electrical and chemical activity in the brain. The activity disrupts normal brain function and can cause various symptoms.       Seizures have many causes, including illness, head injuries, low levels of blood sugar or salt, and certain conditions.       Most seizures will stop on their own in less than 5 minutes. Seizures that last longer than 5 minutes are a medical emergency and need treatment right away.       Many medicines are used to treat seizures. Take over-the-counter and prescription medicines only as told by your health care provider.     This information is not intended to replace advice given to you by your health care provider. Make sure you discuss any questions you have with your health care provider.     Document Released: 12/15/2001Document Revised: 06/25/2021Document Reviewed: 06/25/2021     [employer] Patient Education ? 2021 [employer]

## 2022-03-07 NOTE — PROGRESS NOTES
Subjective   Marilynn Beavers is a 61 y.o. female here to establish care.  Chief Complaint   Patient presents with   • Arthritis   • Seizures   • Hospital Follow Up Visit      2-6-22       History of Present Illness   Patient is here to establish care, previous PCP in [place], moved to [place] in August. Marilynn was admitted to  2/6/22 for status epilepticus, intubated, evaluated for possible alcohol withdrawal; dc'd 2/16/22. Has had constant headache since discharge.  Patient is a poor historian, she is accompanied by her daughter who moved the patient and patient's mother here to be closer so she could help with their care.  is in PT for deconditioning, scheduled with neurology this week.   Patient is unable to say how much alcohol she was drinking, previous records say she had a left temporal lobectomy to manage her seizures, patient states she had a stroke at that time.   The following portions of the patient's history were reviewed and updated as appropriate: allergies, current medications, past family history, past medical history, past social history, past surgical history and problem list.    Review of Systems   Constitutional: Positive for fatigue. Negative for appetite change, chills, diaphoresis and fever.   HENT: Positive for tinnitus. Negative for congestion, ear pain and hearing loss.    Eyes: Positive for visual disturbance. Negative for photophobia, pain and redness.   Respiratory: Positive for cough (occ). Negative for shortness of breath and wheezing.    Cardiovascular: Positive for leg swelling (rare). Negative for chest pain and palpitations.   Gastrointestinal: Positive for constipation and diarrhea. Negative for abdominal pain and blood in stool.   Genitourinary: Negative for difficulty urinating and dysuria.   Neurological: Positive for dizziness, seizures, weakness, numbness (feet) and headaches.   Psychiatric/Behavioral: Positive for confusion, decreased concentration, dysphoric mood,  "hallucinations and sleep disturbance. Negative for self-injury and suicidal ideas. The patient is nervous/anxious.      Blood pressure 150/100, pulse 88, temperature 97.1 °F (36.2 °C), temperature source Infrared, resp. rate 16, height 163.8 cm (64.5\"), weight 90.2 kg (198 lb 12.8 oz), SpO2 96 %.    Allergies   Allergen Reactions   • Codeine Unknown (See Comments)     Past Medical History:   Diagnosis Date   • Depression    • Hypertension    • Memory impairment     since stroke   • Seizures (HCC)    • Stroke (HCC) 2001    during brain surgery for seizures     Past Surgical History:   Procedure Laterality Date   • BRAIN SURGERY  2001    left temporal lobectomy   • CHOLECYSTECTOMY     • HYSTERECTOMY     • SHOULDER SURGERY Bilateral    • WRIST SURGERY Left     wrist fracture repair     Family History   Problem Relation Age of Onset   • Hypertension Mother    • Arthritis Mother      Social History     Socioeconomic History   • Marital status:    Tobacco Use   • Smoking status: Former Smoker     Types: Cigarettes   • Smokeless tobacco: Never Used   • Tobacco comment: does not know how long or how much she smoked   Vaping Use   • Vaping Use: Every day   • Substances: CBD   • Devices: Disposable, Pre-filled or refillable cartridge, Pre-filled pod   Substance and Sexual Activity   • Alcohol use: Not Currently   • Drug use: Never   • Sexual activity: Defer     Immunization History   Administered Date(s) Administered   • COVID-19 (MODERNA) 1st, 2nd, 3rd Dose Only 03/17/2021, 04/15/2021, 12/17/2021       Current Outpatient Medications:   •  Carboxymethylcellulose Sodium 0.25 % solution, Apply 1 drop to eye(s) as directed by provider., Disp: , Rfl:   •  DULoxetine (CYMBALTA) 30 MG capsule, Take 90 mg by mouth Daily., Disp: , Rfl:   •  IBUPROFEN PO, Take 1 tablet by mouth., Disp: , Rfl:   •  levETIRAcetam (KEPPRA) 750 MG tablet, levetiracetam 750 mg tablet, Disp: , Rfl:   •  melatonin 3 MG tablet, Take 3 mg by mouth., " Disp: , Rfl:   •  multivitamin with minerals tablet tablet, Take 1 tablet by mouth Daily., Disp: , Rfl:   •  traZODone (DESYREL) 50 MG tablet, Take 50 mg by mouth Daily., Disp: , Rfl:   •  losartan (Cozaar) 50 MG tablet, Take 1 tablet by mouth Daily., Disp: 30 tablet, Rfl: 1    Objective   Physical Exam  Vitals reviewed.   Constitutional:       Appearance: Normal appearance.   Cardiovascular:      Rate and Rhythm: Normal rate and regular rhythm.      Pulses: Normal pulses.      Heart sounds: Normal heart sounds.   Pulmonary:      Effort: Pulmonary effort is normal.      Breath sounds: Normal breath sounds.   Neurological:      Mental Status: She is alert.   Psychiatric:         Speech: Speech is delayed.         Behavior: Behavior is cooperative.         Thought Content: Thought content normal.         Cognition and Memory: Cognition is impaired. Memory is impaired.         Judgment: Judgment normal.         Assessment/Plan   Diagnoses and all orders for this visit:    1. History of status epilepticus (Primary)    2. Alcohol abuse  -     Ambulatory Referral to Psychiatry    3. Depression with anxiety  -     Ambulatory Referral to Psychiatry    4. Seizure disorder (HCC)    5. Primary hypertension  -     losartan (Cozaar) 50 MG tablet; Take 1 tablet by mouth Daily.  Dispense: 30 tablet; Refill: 1    6. Encounter for screening mammogram for malignant neoplasm of breast  -     Mammo Screening Digital Tomosynthesis Bilateral With CAD; Future      New Medications Ordered This Visit   Medications   • losartan (Cozaar) 50 MG tablet     Sig: Take 1 tablet by mouth Daily.     Dispense:  30 tablet     Refill:  1       patient is scheduled for follow up with  neurology, continue keppra for seizures  Referred to psychiatry for depression, anxiety and alcohol abuse. Continue cymbalta  losartan prescribed for HTN  Records requested from previous primary provider as well as any consulting physician, admitting hospitals, etc.  Further recommendations pending review.  I spent a total of 46 minutes before, during and after the visit reviewing records, updating history and care gaps, educating the patient about his/her condition, ordering labs and prescriptions.

## 2022-04-04 ENCOUNTER — OFFICE VISIT (OUTPATIENT)
Dept: FAMILY MEDICINE CLINIC | Facility: CLINIC | Age: 62
End: 2022-04-04

## 2022-04-04 VITALS
DIASTOLIC BLOOD PRESSURE: 82 MMHG | SYSTOLIC BLOOD PRESSURE: 126 MMHG | OXYGEN SATURATION: 97 % | HEIGHT: 64 IN | BODY MASS INDEX: 33.36 KG/M2 | WEIGHT: 195.4 LBS | HEART RATE: 86 BPM

## 2022-04-04 DIAGNOSIS — F51.01 PRIMARY INSOMNIA: ICD-10-CM

## 2022-04-04 DIAGNOSIS — R41.89 COGNITIVE IMPAIRMENT: ICD-10-CM

## 2022-04-04 DIAGNOSIS — G40.802 FRONTAL LOBE EPILEPSY: ICD-10-CM

## 2022-04-04 DIAGNOSIS — F41.8 DEPRESSION WITH ANXIETY: Primary | ICD-10-CM

## 2022-04-04 PROCEDURE — 99214 OFFICE O/P EST MOD 30 MIN: CPT | Performed by: NURSE PRACTITIONER

## 2022-04-04 RX ORDER — MULTIVIT/IRON SULF/FOLIC ACID 15MG-0.4MG
1 TABLET ORAL DAILY
Qty: 90 TABLET | Refills: 3 | Status: SHIPPED | OUTPATIENT
Start: 2022-04-04

## 2022-04-04 RX ORDER — HYDROXYZINE HYDROCHLORIDE 25 MG/1
25 TABLET, FILM COATED ORAL 3 TIMES DAILY PRN
COMMUNITY

## 2022-04-04 RX ORDER — TRAZODONE HYDROCHLORIDE 50 MG/1
50 TABLET ORAL DAILY
Qty: 30 TABLET | Refills: 1 | Status: SHIPPED | OUTPATIENT
Start: 2022-04-04 | End: 2022-05-31

## 2022-04-04 RX ORDER — DULOXETIN HYDROCHLORIDE 30 MG/1
90 CAPSULE, DELAYED RELEASE ORAL DAILY
Qty: 90 CAPSULE | Refills: 3 | Status: SHIPPED | OUTPATIENT
Start: 2022-04-04 | End: 2023-01-31 | Stop reason: SDUPTHER

## 2022-04-04 RX ORDER — HYDROCHLOROTHIAZIDE 25 MG/1
25 TABLET ORAL 4 TIMES DAILY
COMMUNITY
End: 2022-04-04

## 2022-04-04 RX ORDER — MULTIPLE VITAMINS W/ MINERALS TAB 9MG-400MCG
1 TAB ORAL DAILY
Status: CANCELLED | OUTPATIENT
Start: 2022-04-04

## 2022-04-04 NOTE — PROGRESS NOTES
Subjective   Marilynn Beavers is a 61 y.o. female.      Chief Complaint   Patient presents with   • Hypertension     2 week recheck       History of Present Illness   Patient is here for follow up for HTN, taking losartan as prescribed; her daughter is here with her.  No seizures; saw neurology at  3/9/22; no med changes, continue Jeff  Has talked to a therapist, waiting to see psychiatrist; requests refills for trazodone and cymbalta.  Denies alcohol use since hospital discharge  The following portions of the patient's history were reviewed and updated as appropriate: allergies, current medications, past family history, past medical history, past social history, past surgical history and problem list.    Review of Systems   Constitutional: Positive for fatigue. Negative for chills and fever.   HENT: Positive for postnasal drip.    Eyes: Positive for visual disturbance (peripheral vision loss). Negative for photophobia.   Respiratory: Negative for cough, shortness of breath and wheezing.    Cardiovascular: Positive for leg swelling (occ). Negative for chest pain and palpitations.   Gastrointestinal: Positive for constipation (occ) and diarrhea (occ). Negative for abdominal pain.   Genitourinary: Negative for difficulty urinating and dysuria.   Neurological: Positive for dizziness (if moves quickly).   Psychiatric/Behavioral: Positive for dysphoric mood, hallucinations (visual and audible) and sleep disturbance (occ). Negative for self-injury and suicidal ideas. The patient is nervous/anxious.        Objective   Physical Exam  Vitals reviewed.   Constitutional:       Appearance: Normal appearance.   HENT:      Head: Normocephalic and atraumatic.   Neck:      Vascular: No carotid bruit.   Cardiovascular:      Rate and Rhythm: Normal rate and regular rhythm.      Heart sounds: Normal heart sounds.   Pulmonary:      Effort: Pulmonary effort is normal.      Breath sounds: Normal breath sounds. No wheezing or rhonchi.  "  Musculoskeletal:      Comments: Trace BLE   Neurological:      Mental Status: She is oriented to person, place, and time.   Psychiatric:         Mood and Affect: Mood normal.      Comments: More alert and engaging than previous visit       /82   Pulse 86   Ht 163.8 cm (64.49\")   Wt 88.6 kg (195 lb 6.4 oz)   SpO2 97%   BMI 33.03 kg/m²     Assessment/Plan   Diagnoses and all orders for this visit:    1. Depression with anxiety (Primary)  -     DULoxetine (CYMBALTA) 30 MG capsule; Take 3 capsules by mouth Daily.  Dispense: 90 capsule; Refill: 3    2. Primary insomnia  -     traZODone (DESYREL) 50 MG tablet; Take 1 tablet by mouth Daily.  Dispense: 30 tablet; Refill: 1    3. Cognitive impairment    4. Frontal lobe epilepsy (HCC)    Other orders  -     Multiple Vitamins-Iron (multivitamin with iron) tablet tablet; Take 1 tablet by mouth Daily.  Dispense: 90 tablet; Refill: 3      Depression and anxiety are stable, continue cymbalta and regular visits with psychiatry and therapist  Refilled trazodone for insomnia  Followed by neurology at  for cognitive impairment and epilepsy, continue Keppra  Patient was encouraged to keep me informed of any acute changes, lack of improvement, or any new concerning symptoms.           "

## 2022-04-07 ENCOUNTER — APPOINTMENT (OUTPATIENT)
Dept: OTHER | Facility: HOSPITAL | Age: 62
End: 2022-04-07

## 2022-04-07 ENCOUNTER — HOSPITAL ENCOUNTER (OUTPATIENT)
Dept: MAMMOGRAPHY | Facility: HOSPITAL | Age: 62
Discharge: HOME | End: 2022-04-07
Admitting: NURSE PRACTITIONER

## 2022-04-07 DIAGNOSIS — Z12.31 ENCOUNTER FOR SCREENING MAMMOGRAM FOR MALIGNANT NEOPLASM OF BREAST: ICD-10-CM

## 2022-04-07 PROCEDURE — 77067 SCR MAMMO BI INCL CAD: CPT

## 2022-04-07 PROCEDURE — 77067 SCR MAMMO BI INCL CAD: CPT | Performed by: RADIOLOGY

## 2022-04-07 PROCEDURE — 77063 BREAST TOMOSYNTHESIS BI: CPT

## 2022-04-07 PROCEDURE — 77063 BREAST TOMOSYNTHESIS BI: CPT | Performed by: RADIOLOGY

## 2022-04-15 ENCOUNTER — PRIOR AUTHORIZATION (OUTPATIENT)
Dept: FAMILY MEDICINE CLINIC | Facility: CLINIC | Age: 62
End: 2022-04-15

## 2022-05-06 DIAGNOSIS — I10 PRIMARY HYPERTENSION: ICD-10-CM

## 2022-05-06 RX ORDER — LOSARTAN POTASSIUM 50 MG/1
TABLET ORAL
Qty: 30 TABLET | Refills: 0 | Status: SHIPPED | OUTPATIENT
Start: 2022-05-06 | End: 2022-05-31

## 2022-05-17 ENCOUNTER — OFFICE VISIT (OUTPATIENT)
Dept: FAMILY MEDICINE CLINIC | Facility: CLINIC | Age: 62
End: 2022-05-17

## 2022-05-17 VITALS
DIASTOLIC BLOOD PRESSURE: 80 MMHG | HEIGHT: 64 IN | RESPIRATION RATE: 17 BRPM | OXYGEN SATURATION: 99 % | SYSTOLIC BLOOD PRESSURE: 118 MMHG | WEIGHT: 193.6 LBS | HEART RATE: 112 BPM | BODY MASS INDEX: 33.05 KG/M2

## 2022-05-17 DIAGNOSIS — R41.3 MEMORY IMPAIRMENT: ICD-10-CM

## 2022-05-17 DIAGNOSIS — F33.9 EPISODE OF RECURRENT MAJOR DEPRESSIVE DISORDER, UNSPECIFIED DEPRESSION EPISODE SEVERITY: ICD-10-CM

## 2022-05-17 DIAGNOSIS — Z13.220 LIPID SCREENING: ICD-10-CM

## 2022-05-17 DIAGNOSIS — I10 PRIMARY HYPERTENSION: ICD-10-CM

## 2022-05-17 DIAGNOSIS — Z00.00 MEDICARE ANNUAL WELLNESS VISIT, SUBSEQUENT: Primary | ICD-10-CM

## 2022-05-17 DIAGNOSIS — F51.01 PRIMARY INSOMNIA: ICD-10-CM

## 2022-05-17 DIAGNOSIS — Z13.29 THYROID DISORDER SCREENING: ICD-10-CM

## 2022-05-17 DIAGNOSIS — G40.802 FRONTAL LOBE EPILEPSY: ICD-10-CM

## 2022-05-17 DIAGNOSIS — Z12.11 COLON CANCER SCREENING: ICD-10-CM

## 2022-05-17 DIAGNOSIS — F41.9 ANXIETY: ICD-10-CM

## 2022-05-17 PROCEDURE — 99396 PREV VISIT EST AGE 40-64: CPT | Performed by: NURSE PRACTITIONER

## 2022-05-17 PROCEDURE — 1170F FXNL STATUS ASSESSED: CPT | Performed by: NURSE PRACTITIONER

## 2022-05-17 PROCEDURE — 1159F MED LIST DOCD IN RCRD: CPT | Performed by: NURSE PRACTITIONER

## 2022-05-17 PROCEDURE — G0439 PPPS, SUBSEQ VISIT: HCPCS | Performed by: NURSE PRACTITIONER

## 2022-05-17 RX ORDER — LEVETIRACETAM 750 MG/1
750 TABLET ORAL EVERY 12 HOURS
COMMUNITY
Start: 2022-04-27

## 2022-05-17 NOTE — PROGRESS NOTES
The ABCs of the Annual Wellness Visit  Subsequent Medicare Wellness Visit    Chief Complaint   Patient presents with   • Annual Exam     Wellness visit    • medicare wellness   • Hypertension   • Cognitive impairment      Subjective    History of Present Illness:  Marilynn Beavers is a 61 y.o. female who presents for a Subsequent Medicare Wellness Visit.  Patient is a poor historian, history of stroke, seizures, memory impairment, alcohol abuse.  Is accompanied by her daughter who helps with history.  Patient moved to [place] from [place] in August 2021 so her daughter could help take care of her and her mother.  Patient was hospitalized at  in February 2022 for status epilepticus she was intubated during that admission and evaluated for possible alcohol withdrawal, denies alcohol use since that time.  Patient admits she drinks several Mountain Dew every day, she does have problems sleeping at night  The following portions of the patient's history were reviewed and   updated as appropriate: allergies, current medications, past family history, past medical history, past social history, past surgical history and problem list.    Compared to one year ago, the patient feels her physical   health is the same.    Compared to one year ago, the patient feels her mental   health is the same.    Recent Hospitalizations:  She was admitted within the past 365 days at [facility]. 02/2022      Current Medical Providers:  Patient Care Team:  Fatuma Stevenson APRN as PCP - General (Nurse Practitioner)    Outpatient Medications Prior to Visit   Medication Sig Dispense Refill   • DULoxetine (CYMBALTA) 30 MG capsule Take 3 capsules by mouth Daily. 90 capsule 3   • hydrOXYzine (ATARAX) 25 MG tablet Take 25 mg by mouth 3 (Three) Times a Day As Needed.     • IBUPROFEN PO Take 1 tablet by mouth.     • levETIRAcetam (KEPPRA) 750 MG tablet levetiracetam 750 mg tablet     • levETIRAcetam (KEPPRA) 750 MG tablet Take 1,500 mg by  mouth Every 12 (Twelve) Hours.     • losartan (COZAAR) 50 MG tablet Take 1 tablet by mouth once daily 30 tablet 0   • melatonin 3 MG tablet Take 3 mg by mouth.     • Multiple Vitamins-Iron (multivitamin with iron) tablet tablet Take 1 tablet by mouth Daily. 90 tablet 3   • traZODone (DESYREL) 50 MG tablet Take 1 tablet by mouth Daily. 30 tablet 1     No facility-administered medications prior to visit.       No opioid medication identified on active medication list. I have reviewed chart for other potential  high risk medication/s and harmful drug interactions in the elderly.          Aspirin is not on active medication list.  Aspirin use is not indicated based on review of current medical condition/s. Risk of harm outweighs potential benefits.  .    Patient Active Problem List   Diagnosis   • Memory impairment   • Episode of recurrent major depressive disorder (HCC)   • Frontal lobe epilepsy (HCC)   • Lumbago with sciatica   • Anxiety   • Alcohol abuse   • Depression   • Paresthesia   • Primary hypertension   • Primary insomnia     Advance Care Planning  Advance Directive is not on file.  ACP discussion was held with the patient during this visit. Patient does not have an advance directive, information provided.    Review of Systems   Constitutional: Positive for fatigue. Negative for chills, diaphoresis and fever.   HENT: Positive for postnasal drip.    Eyes: Positive for visual disturbance (occ poor peripheral vision).   Respiratory: Positive for cough (PND). Negative for shortness of breath and wheezing.    Cardiovascular: Negative for chest pain, palpitations and leg swelling.   Gastrointestinal: Positive for abdominal pain (occ), constipation and diarrhea. Negative for blood in stool, nausea and vomiting.   Endocrine: Negative for cold intolerance, heat intolerance, polydipsia and polyuria.   Genitourinary: Negative for difficulty urinating, dysuria and vaginal bleeding (hysterectomy).   Musculoskeletal:  "Positive for arthralgias, back pain, neck pain and neck stiffness.   Skin: Negative for color change, pallor, rash and wound.   Allergic/Immunologic: Positive for environmental allergies.   Neurological: Positive for dizziness (if moves too quickly), tremors (hands), seizures (last in Feb) and numbness (feet).   Psychiatric/Behavioral: Positive for dysphoric mood and sleep disturbance. The patient is nervous/anxious.         Objective    Vitals:    05/17/22 1010   BP: 118/80   Pulse: 112   Resp: 17   SpO2: 99%   Weight: 87.8 kg (193 lb 9.6 oz)   Height: 163.8 cm (64.49\")     BMI is >= 30 and <= 34.9 (Class 1 obesity). The following options were offered after discussion: weight loss educational material (shared in after visit summary), exercise counseling/recommendations and nutrition counseling/recommendations  Does the patient have evidence of cognitive impairment? Yes    Physical Exam  Vitals and nursing note reviewed.   Constitutional:       General: She is not in acute distress.     Appearance: Normal appearance. She is well-developed. She is not diaphoretic.   HENT:      Head: Normocephalic and atraumatic.      Right Ear: External ear normal.      Left Ear: External ear normal.      Nose: Nose normal.   Eyes:      General: No scleral icterus.        Right eye: No discharge.         Left eye: No discharge.      Conjunctiva/sclera: Conjunctivae normal.      Pupils: Pupils are equal, round, and reactive to light.   Neck:      Thyroid: No thyromegaly.      Vascular: No JVD.      Trachea: No tracheal deviation.   Cardiovascular:      Rate and Rhythm: Regular rhythm. Tachycardia present.      Heart sounds: Normal heart sounds. No murmur heard.    No friction rub. No gallop.   Pulmonary:      Effort: Pulmonary effort is normal. No respiratory distress.      Breath sounds: Normal breath sounds. No stridor. No wheezing or rales.   Chest:      Chest wall: No tenderness.   Abdominal:      General: Bowel sounds are normal. " There is no distension.      Palpations: Abdomen is soft. There is no mass.      Tenderness: There is no abdominal tenderness. There is no guarding or rebound.      Hernia: No hernia is present.   Musculoskeletal:         General: No tenderness or deformity.      Cervical back: Normal range of motion and neck supple.   Lymphadenopathy:      Cervical: No cervical adenopathy.   Skin:     General: Skin is warm and dry.      Coloration: Skin is not pale.      Findings: No erythema or rash.   Neurological:      Mental Status: She is alert and oriented to person, place, and time.      Cranial Nerves: No cranial nerve deficit.      Motor: No abnormal muscle tone.      Coordination: Coordination normal.      Deep Tendon Reflexes: Reflexes are normal and symmetric. Reflexes normal.   Psychiatric:         Behavior: Behavior normal.         Thought Content: Thought content normal.         Judgment: Judgment normal.       Lab Results   Component Value Date     (H) 03/09/2022            HEALTH RISK ASSESSMENT    Smoking Status:  Social History     Tobacco Use   Smoking Status Former Smoker   • Types: Cigarettes   Smokeless Tobacco Never Used   Tobacco Comment    does not know how long or how much she smoked     Alcohol Consumption:  Social History     Substance and Sexual Activity   Alcohol Use Not Currently     Fall Risk Screen:    [facility] Fall Risk Assessment has not been completed.    Depression Screening:  PHQ-2/PHQ-9 Depression Screening 5/17/2022   Little Interest or Pleasure in Doing Things 3-->nearly every day   Feeling Down, Depressed or Hopeless 3-->nearly every day   Trouble Falling or Staying Asleep, or Sleeping Too Much 3-->nearly every day   Feeling Tired or Having Little Energy 3-->nearly every day   Poor Appetite or Overeating 1-->several days   Feeling Bad about Yourself - or that You are a Failure or Have Let Yourself or Your Family Down 2-->more than half the days   Trouble Concentrating on Things, Such  as Reading the Newspaper or Watching Television 3-->nearly every day   Moving or Speaking So Slowly that Other People Could Have Noticed? Or the Opposite - Being So Fidgety 0-->not at all   Thoughts that You Would be Better Off Dead or of Hurting Yourself in Some Way 3-->nearly every day   PHQ-9: Brief Depression Severity Measure Score 21   If You Checked Off Any Problems, How Difficult Have These Problems Made It For You to Do Your Work, Take Care of Things at Home, or Get Along with Other People? very difficult       Health Habits and Functional and Cognitive Screening:  Functional & Cognitive Status 5/17/2022   Do you have difficulty preparing food and eating? No   Do you have difficulty bathing yourself, getting dressed or grooming yourself? No   Do you have difficulty using the toilet? No   Do you have difficulty moving around from place to place? No   Do you have trouble with steps or getting out of a bed or a chair? No   Current Diet Unhealthy Diet   Dental Exam Up to date   Eye Exam Not up to date   Exercise (times per week) 0 times per week   Current Exercises Include No Regular Exercise   Do you need help using the phone?  No   Are you deaf or do you have serious difficulty hearing?  No   Do you need help with transportation? No   Do you need help shopping? No   Do you need help preparing meals?  No   Do you need help with housework?  No   Do you need help with laundry? No   Do you need help taking your medications? No   Do you need help managing money? No   Do you ever drive or ride in a car without wearing a seat belt? No   Have you felt unusual stress, anger or loneliness in the last month? Yes   Who do you live with? Child   If you need help, do you have trouble finding someone available to you? No   Have you been bothered in the last four weeks by sexual problems? No   Do you have difficulty concentrating, remembering or making decisions? Yes       Age-appropriate Screening Schedule:  Refer to the  "list below for future screening recommendations based on patient's age, sex and/or medical conditions. Orders for these recommended tests are listed in the plan section. The patient has been provided with a written plan.    Health Maintenance   Topic Date Due   • TDAP/TD VACCINES (1 - Tdap) Never done   • ZOSTER VACCINE (1 of 2) Never done   • INFLUENZA VACCINE  08/01/2022   • MAMMOGRAM  04/07/2023              Assessment & Plan   CMS Preventative Services Quick Reference  Risk Factors Identified During Encounter  Alcohol Misuse  Dementia/Memory   Inactivity/Sedentary  Obesity/Overweight   The above risks/problems have been discussed with the patient.  Follow up actions/plans if indicated are seen below in the Assessment/Plan Section.  Pertinent information has been shared with the patient in the After Visit Summary.  /80   Pulse 112   Resp 17   Ht 163.8 cm (64.49\")   Wt 87.8 kg (193 lb 9.6 oz)   SpO2 99%   BMI 32.73 kg/m²     Diagnoses and all orders for this visit:    1. Medicare annual wellness visit, subsequent (Primary)    2. Primary insomnia    3. Colon cancer screening  -     Ambulatory Referral For Screening Colonoscopy    4. Primary hypertension  -     Comprehensive Metabolic Panel; Future    5. Lipid screening  -     Lipid Panel; Future    6. Thyroid disorder screening  -     TSH Rfx On Abnormal To Free T4; Future    7. Anxiety    8. Episode of recurrent major depressive disorder, unspecified depression episode severity (HCC)    9. Frontal lobe epilepsy (HCC)    10. Memory impairment      Nutrition and activity goals reviewed including: mainly water to drink, limit white flour/processed sugar, and processed foods, choose fresh fruits, vegetables, fish, lean meats,high fiber carbs, exercise  working toward 150 mins cardio per week, weight training 2x/week.  Screening labs ordered to evaluate chronic conditions. I will contact patient regarding test results and provide instructions regarding any " necessary changes in plan of care.  Continue trazodone for insomnia.  Encouraged patient to wean down or off of caffeine discussed Mountain Dew contains high amount of caffeine.  Hypertension is well controlled continue current medication.  Continue current medications for depression and anxiety patient was referred to psychiatry in March, still working on establishing care for this  Followed by  neurology for epilepsy and memory impairment.   Adequate calcium, vitamin D and weight bearing exercise are important to reduce risk of osteoporosis. Fall prevention is also important to reduce the risk of fracture. Reduce fall risk with regular vision checks, medication review, osteoporosis screening, strength and balance training. Remove tripping hazards, avoid standing too quickly, limit alcohol. Eat a healthy well-balanced diet, stay hydrated.    Information provided in MyChart on managing depression and anxiety, insomnia, dyslipidemia.        Follow Up:   No follow-ups on file.     An After Visit Summary and PPPS were made available to the patient.

## 2022-05-23 PROBLEM — I10 PRIMARY HYPERTENSION: Status: ACTIVE | Noted: 2022-05-23

## 2022-05-23 PROBLEM — R41.3 MEMORY IMPAIRMENT: Status: ACTIVE | Noted: 2022-02-12

## 2022-05-23 PROBLEM — F51.01 PRIMARY INSOMNIA: Status: ACTIVE | Noted: 2022-05-23

## 2022-05-30 DIAGNOSIS — I10 PRIMARY HYPERTENSION: ICD-10-CM

## 2022-05-30 DIAGNOSIS — F51.01 PRIMARY INSOMNIA: ICD-10-CM

## 2022-05-31 RX ORDER — LOSARTAN POTASSIUM 50 MG/1
TABLET ORAL
Qty: 30 TABLET | Refills: 0 | Status: SHIPPED | OUTPATIENT
Start: 2022-05-31 | End: 2022-06-08

## 2022-05-31 RX ORDER — TRAZODONE HYDROCHLORIDE 50 MG/1
TABLET ORAL
Qty: 30 TABLET | Refills: 1 | Status: SHIPPED | OUTPATIENT
Start: 2022-05-31 | End: 2022-08-08

## 2022-05-31 NOTE — TELEPHONE ENCOUNTER
Rx Refill Note  Requested Prescriptions     Pending Prescriptions Disp Refills   • traZODone (DESYREL) 50 MG tablet [Pharmacy Med Name: traZODone 50 MG TABLET] 30 tablet 1     Sig: TAKE ONE TABLET BY MOUTH DAILY      Last office visit with prescribing clinician: 5/17/2022      Next office visit with prescribing clinician: 5/30/2022            Esther Vergara MA  05/31/22, 09:58 EDT

## 2022-05-31 NOTE — TELEPHONE ENCOUNTER
Rx Refill Note  Requested Prescriptions     Pending Prescriptions Disp Refills   • losartan (COZAAR) 50 MG tablet [Pharmacy Med Name: Losartan Potassium 50 MG Oral Tablet] 30 tablet 0     Sig: Take 1 tablet by mouth once daily      Last office visit with prescribing clinician: 5/17/2022      Next office visit with prescribing clinician: Visit date not found            Esther Vergara MA  05/31/22, 09:58 EDT

## 2022-06-08 DIAGNOSIS — I10 PRIMARY HYPERTENSION: ICD-10-CM

## 2022-06-08 RX ORDER — LOSARTAN POTASSIUM 50 MG/1
TABLET ORAL
Qty: 30 TABLET | Refills: 0 | Status: SHIPPED | OUTPATIENT
Start: 2022-06-08 | End: 2022-08-08

## 2022-06-08 NOTE — TELEPHONE ENCOUNTER
Rx Refill Note  Requested Prescriptions     Pending Prescriptions Disp Refills   • losartan (COZAAR) 50 MG tablet [Pharmacy Med Name: Losartan Potassium 50 MG Oral Tablet] 30 tablet 0     Sig: Take 1 tablet by mouth once daily      Last office visit with prescribing clinician: 5/17/2022      Next office visit with prescribing clinician: Visit date not found   Silvia Herrera MA  06/08/22, 13:59 EDT     Last fill: 05/31/2022

## 2022-06-14 ENCOUNTER — PRIOR AUTHORIZATION (OUTPATIENT)
Dept: GASTROENTEROLOGY | Facility: CLINIC | Age: 62
End: 2022-06-14

## 2022-06-14 DIAGNOSIS — Z12.11 ENCOUNTER FOR SCREENING COLONOSCOPY: Primary | ICD-10-CM

## 2022-07-06 ENCOUNTER — OUTSIDE FACILITY SERVICE (OUTPATIENT)
Dept: GASTROENTEROLOGY | Facility: CLINIC | Age: 62
End: 2022-07-06

## 2022-07-06 PROCEDURE — 45380 COLONOSCOPY AND BIOPSY: CPT | Performed by: INTERNAL MEDICINE

## 2022-07-06 PROCEDURE — 45385 COLONOSCOPY W/LESION REMOVAL: CPT | Performed by: INTERNAL MEDICINE

## 2022-07-06 PROCEDURE — 88305 TISSUE EXAM BY PATHOLOGIST: CPT | Performed by: INTERNAL MEDICINE

## 2022-07-07 ENCOUNTER — LAB REQUISITION (OUTPATIENT)
Dept: LAB | Facility: HOSPITAL | Age: 62
End: 2022-07-07

## 2022-07-07 DIAGNOSIS — D12.5 BENIGN NEOPLASM OF SIGMOID COLON: ICD-10-CM

## 2022-07-07 DIAGNOSIS — Z12.11 ENCOUNTER FOR SCREENING FOR MALIGNANT NEOPLASM OF COLON: ICD-10-CM

## 2022-07-07 DIAGNOSIS — K64.8 OTHER HEMORRHOIDS: ICD-10-CM

## 2022-07-07 DIAGNOSIS — D12.4 BENIGN NEOPLASM OF DESCENDING COLON: ICD-10-CM

## 2022-07-07 DIAGNOSIS — K57.30 DIVERTICULOSIS OF LARGE INTESTINE WITHOUT PERFORATION OR ABSCESS WITHOUT BLEEDING: ICD-10-CM

## 2022-07-08 LAB
CYTO UR: NORMAL
LAB AP CASE REPORT: NORMAL
LAB AP CLINICAL INFORMATION: NORMAL
PATH REPORT.FINAL DX SPEC: NORMAL
PATH REPORT.GROSS SPEC: NORMAL

## 2022-08-07 DIAGNOSIS — F51.01 PRIMARY INSOMNIA: ICD-10-CM

## 2022-08-07 DIAGNOSIS — I10 PRIMARY HYPERTENSION: ICD-10-CM

## 2022-08-08 RX ORDER — LOSARTAN POTASSIUM 50 MG/1
TABLET ORAL
Qty: 30 TABLET | Refills: 0 | Status: SHIPPED | OUTPATIENT
Start: 2022-08-08 | End: 2023-01-31 | Stop reason: SDUPTHER

## 2022-08-08 RX ORDER — TRAZODONE HYDROCHLORIDE 50 MG/1
TABLET ORAL
Qty: 30 TABLET | Refills: 1 | Status: SHIPPED | OUTPATIENT
Start: 2022-08-08 | End: 2023-01-31 | Stop reason: SDUPTHER

## 2022-08-08 NOTE — TELEPHONE ENCOUNTER
Rx Refill Note  Requested Prescriptions     Pending Prescriptions Disp Refills   • losartan (COZAAR) 50 MG tablet [Pharmacy Med Name: Losartan Potassium 50 MG Oral Tablet] 30 tablet 0     Sig: Take 1 tablet by mouth once daily      Last office visit with prescribing clinician: 5/17/2022      Next office visit with prescribing clinician: Visit date not found   Silvia Herrera MA  08/08/22, 09:56 EDT     Last fill: 06/08/2022

## 2022-08-08 NOTE — TELEPHONE ENCOUNTER
Rx Refill Note  Requested Prescriptions     Pending Prescriptions Disp Refills   • traZODone (DESYREL) 50 MG tablet [Pharmacy Med Name: traZODone 50 MG TABLET] 30 tablet 1     Sig: TAKE ONE TABLET BY MOUTH DAILY      Last office visit with prescribing clinician: 5/17/2022      Next office visit with prescribing clinician: 8/7/2022 }  Silvia Herrera MA  08/08/22, 09:59 EDT     Last fill: 05/31/2022

## 2023-01-31 ENCOUNTER — OFFICE VISIT (OUTPATIENT)
Dept: FAMILY MEDICINE CLINIC | Facility: CLINIC | Age: 63
End: 2023-01-31
Payer: COMMERCIAL

## 2023-01-31 ENCOUNTER — PRIOR AUTHORIZATION (OUTPATIENT)
Dept: FAMILY MEDICINE CLINIC | Facility: CLINIC | Age: 63
End: 2023-01-31
Payer: MEDICARE

## 2023-01-31 ENCOUNTER — LAB (OUTPATIENT)
Dept: LAB | Facility: HOSPITAL | Age: 63
End: 2023-01-31
Payer: MEDICARE

## 2023-01-31 VITALS
SYSTOLIC BLOOD PRESSURE: 124 MMHG | OXYGEN SATURATION: 96 % | BODY MASS INDEX: 31.24 KG/M2 | HEART RATE: 94 BPM | HEIGHT: 64 IN | WEIGHT: 183 LBS | DIASTOLIC BLOOD PRESSURE: 78 MMHG

## 2023-01-31 DIAGNOSIS — R73.09 ELEVATED GLUCOSE: ICD-10-CM

## 2023-01-31 DIAGNOSIS — R41.3 MEMORY IMPAIRMENT: ICD-10-CM

## 2023-01-31 DIAGNOSIS — G89.29 CHRONIC NONINTRACTABLE HEADACHE, UNSPECIFIED HEADACHE TYPE: ICD-10-CM

## 2023-01-31 DIAGNOSIS — Z51.81 THERAPEUTIC DRUG MONITORING: ICD-10-CM

## 2023-01-31 DIAGNOSIS — I10 PRIMARY HYPERTENSION: ICD-10-CM

## 2023-01-31 DIAGNOSIS — G89.29 CHRONIC BILATERAL LOW BACK PAIN WITH BILATERAL SCIATICA: ICD-10-CM

## 2023-01-31 DIAGNOSIS — E53.8 B12 DEFICIENCY: ICD-10-CM

## 2023-01-31 DIAGNOSIS — F51.01 PRIMARY INSOMNIA: ICD-10-CM

## 2023-01-31 DIAGNOSIS — R51.9 CHRONIC NONINTRACTABLE HEADACHE, UNSPECIFIED HEADACHE TYPE: ICD-10-CM

## 2023-01-31 DIAGNOSIS — Z23 FLU VACCINE NEED: ICD-10-CM

## 2023-01-31 DIAGNOSIS — E55.9 VITAMIN D DEFICIENCY: ICD-10-CM

## 2023-01-31 DIAGNOSIS — M54.42 CHRONIC BILATERAL LOW BACK PAIN WITH BILATERAL SCIATICA: ICD-10-CM

## 2023-01-31 DIAGNOSIS — Z76.89 ENCOUNTER TO ESTABLISH CARE WITH NEW DOCTOR: ICD-10-CM

## 2023-01-31 DIAGNOSIS — Z90.89 S/P LOBECTOMY OF BRAIN: ICD-10-CM

## 2023-01-31 DIAGNOSIS — R44.1 VISUAL HALLUCINATIONS: ICD-10-CM

## 2023-01-31 DIAGNOSIS — Z00.00 HEALTHCARE MAINTENANCE: ICD-10-CM

## 2023-01-31 DIAGNOSIS — F20.9 SCHIZOPHRENIA, UNSPECIFIED TYPE: ICD-10-CM

## 2023-01-31 DIAGNOSIS — R44.0 AUDITORY HALLUCINATIONS: Primary | ICD-10-CM

## 2023-01-31 DIAGNOSIS — E78.2 MIXED HYPERLIPIDEMIA: ICD-10-CM

## 2023-01-31 DIAGNOSIS — G40.802 FRONTAL LOBE EPILEPSY: ICD-10-CM

## 2023-01-31 DIAGNOSIS — M54.41 CHRONIC BILATERAL LOW BACK PAIN WITH BILATERAL SCIATICA: ICD-10-CM

## 2023-01-31 DIAGNOSIS — F41.8 DEPRESSION WITH ANXIETY: ICD-10-CM

## 2023-01-31 LAB
25(OH)D3 SERPL-MCNC: 30.7 NG/ML (ref 30–100)
ALBUMIN SERPL-MCNC: 4.4 G/DL (ref 3.5–5.2)
ALBUMIN/GLOB SERPL: 2.1 G/DL
ALP SERPL-CCNC: 86 U/L (ref 39–117)
ALT SERPL W P-5'-P-CCNC: 10 U/L (ref 1–33)
ANION GAP SERPL CALCULATED.3IONS-SCNC: 8 MMOL/L (ref 5–15)
AST SERPL-CCNC: 14 U/L (ref 1–32)
BASOPHILS # BLD AUTO: 0.04 10*3/MM3 (ref 0–0.2)
BASOPHILS NFR BLD AUTO: 0.6 % (ref 0–1.5)
BILIRUB SERPL-MCNC: 0.5 MG/DL (ref 0–1.2)
BUN SERPL-MCNC: 13 MG/DL (ref 8–23)
BUN/CREAT SERPL: 14.8 (ref 7–25)
CALCIUM SPEC-SCNC: 9.4 MG/DL (ref 8.6–10.5)
CHLORIDE SERPL-SCNC: 105 MMOL/L (ref 98–107)
CHOLEST SERPL-MCNC: 234 MG/DL (ref 0–200)
CO2 SERPL-SCNC: 26 MMOL/L (ref 22–29)
CREAT SERPL-MCNC: 0.88 MG/DL (ref 0.57–1)
DEPRECATED RDW RBC AUTO: 41.3 FL (ref 37–54)
EGFRCR SERPLBLD CKD-EPI 2021: 73.5 ML/MIN/1.73
EOSINOPHIL # BLD AUTO: 0.1 10*3/MM3 (ref 0–0.4)
EOSINOPHIL NFR BLD AUTO: 1.6 % (ref 0.3–6.2)
ERYTHROCYTE [DISTWIDTH] IN BLOOD BY AUTOMATED COUNT: 13.3 % (ref 12.3–15.4)
FOLATE SERPL-MCNC: 11.6 NG/ML (ref 4.78–24.2)
GLOBULIN UR ELPH-MCNC: 2.1 GM/DL
GLUCOSE SERPL-MCNC: 114 MG/DL (ref 65–99)
HCT VFR BLD AUTO: 39.5 % (ref 34–46.6)
HCV AB SER DONR QL: NORMAL
HDLC SERPL-MCNC: 34 MG/DL (ref 40–60)
HGB BLD-MCNC: 13 G/DL (ref 12–15.9)
IMM GRANULOCYTES # BLD AUTO: 0.01 10*3/MM3 (ref 0–0.05)
IMM GRANULOCYTES NFR BLD AUTO: 0.2 % (ref 0–0.5)
LDLC SERPL CALC-MCNC: 169 MG/DL (ref 0–100)
LDLC/HDLC SERPL: 4.91 {RATIO}
LYMPHOCYTES # BLD AUTO: 1.7 10*3/MM3 (ref 0.7–3.1)
LYMPHOCYTES NFR BLD AUTO: 26.8 % (ref 19.6–45.3)
MCH RBC QN AUTO: 28.4 PG (ref 26.6–33)
MCHC RBC AUTO-ENTMCNC: 32.9 G/DL (ref 31.5–35.7)
MCV RBC AUTO: 86.2 FL (ref 79–97)
MONOCYTES # BLD AUTO: 0.38 10*3/MM3 (ref 0.1–0.9)
MONOCYTES NFR BLD AUTO: 6 % (ref 5–12)
NEUTROPHILS NFR BLD AUTO: 4.12 10*3/MM3 (ref 1.7–7)
NEUTROPHILS NFR BLD AUTO: 64.8 % (ref 42.7–76)
NRBC BLD AUTO-RTO: 0 /100 WBC (ref 0–0.2)
PLATELET # BLD AUTO: 277 10*3/MM3 (ref 140–450)
PMV BLD AUTO: 10.8 FL (ref 6–12)
POTASSIUM SERPL-SCNC: 4.4 MMOL/L (ref 3.5–5.2)
PROT SERPL-MCNC: 6.5 G/DL (ref 6–8.5)
RBC # BLD AUTO: 4.58 10*6/MM3 (ref 3.77–5.28)
SODIUM SERPL-SCNC: 139 MMOL/L (ref 136–145)
TRIGL SERPL-MCNC: 165 MG/DL (ref 0–150)
TSH SERPL DL<=0.05 MIU/L-ACNC: 1.63 UIU/ML (ref 0.27–4.2)
VIT B12 BLD-MCNC: 362 PG/ML (ref 211–946)
VLDLC SERPL-MCNC: 31 MG/DL (ref 5–40)
WBC NRBC COR # BLD: 6.35 10*3/MM3 (ref 3.4–10.8)

## 2023-01-31 PROCEDURE — 80061 LIPID PANEL: CPT

## 2023-01-31 PROCEDURE — 86803 HEPATITIS C AB TEST: CPT

## 2023-01-31 PROCEDURE — 90471 IMMUNIZATION ADMIN: CPT | Performed by: NURSE PRACTITIONER

## 2023-01-31 PROCEDURE — 83036 HEMOGLOBIN GLYCOSYLATED A1C: CPT

## 2023-01-31 PROCEDURE — 82306 VITAMIN D 25 HYDROXY: CPT

## 2023-01-31 PROCEDURE — 99214 OFFICE O/P EST MOD 30 MIN: CPT | Performed by: NURSE PRACTITIONER

## 2023-01-31 PROCEDURE — 90686 IIV4 VACC NO PRSV 0.5 ML IM: CPT | Performed by: NURSE PRACTITIONER

## 2023-01-31 PROCEDURE — 80050 GENERAL HEALTH PANEL: CPT

## 2023-01-31 PROCEDURE — 80177 DRUG SCRN QUAN LEVETIRACETAM: CPT

## 2023-01-31 PROCEDURE — 82746 ASSAY OF FOLIC ACID SERUM: CPT

## 2023-01-31 PROCEDURE — 82607 VITAMIN B-12: CPT

## 2023-01-31 RX ORDER — MELOXICAM 7.5 MG/1
7.5 TABLET ORAL DAILY
Qty: 90 TABLET | Refills: 1 | Status: SHIPPED | OUTPATIENT
Start: 2023-01-31

## 2023-01-31 RX ORDER — LOSARTAN POTASSIUM 50 MG/1
50 TABLET ORAL DAILY
Qty: 90 TABLET | Refills: 1 | Status: SHIPPED | OUTPATIENT
Start: 2023-01-31

## 2023-01-31 RX ORDER — ARIPIPRAZOLE 2 MG/1
2 TABLET ORAL DAILY
COMMUNITY
End: 2023-01-31 | Stop reason: SDUPTHER

## 2023-01-31 RX ORDER — TRAZODONE HYDROCHLORIDE 50 MG/1
50 TABLET ORAL DAILY
Qty: 90 TABLET | Refills: 1 | Status: SHIPPED | OUTPATIENT
Start: 2023-01-31

## 2023-01-31 RX ORDER — DULOXETIN HYDROCHLORIDE 30 MG/1
90 CAPSULE, DELAYED RELEASE ORAL DAILY
Qty: 270 CAPSULE | Refills: 1 | Status: SHIPPED | OUTPATIENT
Start: 2023-01-31

## 2023-01-31 RX ORDER — ARIPIPRAZOLE 2 MG/1
2 TABLET ORAL DAILY
Qty: 90 TABLET | Refills: 1 | Status: SHIPPED | OUTPATIENT
Start: 2023-01-31

## 2023-01-31 NOTE — TELEPHONE ENCOUNTER
Completed Prior Auth for Cymbalta    KEY:LETY  Awaiting determination from [facility]

## 2023-02-01 LAB — HBA1C MFR BLD: 5.7 % (ref 4.8–5.6)

## 2023-02-01 RX ORDER — LANOLIN ALCOHOL/MO/W.PET/CERES
1000 CREAM (GRAM) TOPICAL DAILY
Qty: 90 TABLET | Refills: 3 | Status: SHIPPED | OUTPATIENT
Start: 2023-02-01

## 2023-02-01 RX ORDER — CHOLECALCIFEROL (VITAMIN D3) 50 MCG
1 TABLET ORAL DAILY
Qty: 90 EACH | Refills: 3 | Status: SHIPPED | OUTPATIENT
Start: 2023-02-01

## 2023-02-01 RX ORDER — ROSUVASTATIN CALCIUM 5 MG/1
5 TABLET, COATED ORAL DAILY
Qty: 90 TABLET | Refills: 3 | Status: SHIPPED | OUTPATIENT
Start: 2023-02-01 | End: 2023-04-08 | Stop reason: SDUPTHER

## 2023-02-01 NOTE — TELEPHONE ENCOUNTER
Approved today  Approved. This drug has been approved under the Member's Medicare Part D benefit. Approved quantity: 270 capsules per 90 day(s). You may fill up to a 90 day supply except for those on Specialty Tier 5, which can be filled up to a 30 day supply. Please call the pharmacy to process the prescription claim.

## 2023-02-03 LAB — LEVETIRACETAM SERPL-MCNC: 75.3 UG/ML (ref 10–40)

## 2023-03-08 ENCOUNTER — TRANSCRIBE ORDERS (OUTPATIENT)
Dept: ADMINISTRATIVE | Facility: HOSPITAL | Age: 63
End: 2023-03-08
Payer: MEDICARE

## 2023-03-08 DIAGNOSIS — Z12.31 VISIT FOR SCREENING MAMMOGRAM: Primary | ICD-10-CM

## 2023-04-04 ENCOUNTER — LAB (OUTPATIENT)
Dept: LAB | Facility: HOSPITAL | Age: 63
End: 2023-04-04
Payer: MEDICARE

## 2023-04-04 ENCOUNTER — OFFICE VISIT (OUTPATIENT)
Dept: FAMILY MEDICINE CLINIC | Facility: CLINIC | Age: 63
End: 2023-04-04
Payer: MEDICARE

## 2023-04-04 VITALS
HEIGHT: 64 IN | TEMPERATURE: 97.9 F | HEART RATE: 78 BPM | BODY MASS INDEX: 30.32 KG/M2 | OXYGEN SATURATION: 98 % | DIASTOLIC BLOOD PRESSURE: 80 MMHG | WEIGHT: 177.6 LBS | SYSTOLIC BLOOD PRESSURE: 130 MMHG

## 2023-04-04 DIAGNOSIS — E78.2 MIXED HYPERLIPIDEMIA: ICD-10-CM

## 2023-04-04 DIAGNOSIS — I10 PRIMARY HYPERTENSION: ICD-10-CM

## 2023-04-04 DIAGNOSIS — R42 DIZZINESS: Primary | ICD-10-CM

## 2023-04-04 DIAGNOSIS — Z51.81 THERAPEUTIC DRUG MONITORING: ICD-10-CM

## 2023-04-04 DIAGNOSIS — K59.04 CHRONIC IDIOPATHIC CONSTIPATION: ICD-10-CM

## 2023-04-04 DIAGNOSIS — Z13.29 THYROID DISORDER SCREENING: ICD-10-CM

## 2023-04-04 DIAGNOSIS — R19.5 HARD STOOL: ICD-10-CM

## 2023-04-04 DIAGNOSIS — Z13.220 LIPID SCREENING: ICD-10-CM

## 2023-04-04 DIAGNOSIS — G40.802 FRONTAL LOBE EPILEPSY: ICD-10-CM

## 2023-04-04 LAB
ALBUMIN SERPL-MCNC: 4.7 G/DL (ref 3.5–5.2)
ALBUMIN/GLOB SERPL: 2 G/DL
ALP SERPL-CCNC: 80 U/L (ref 39–117)
ALT SERPL W P-5'-P-CCNC: 10 U/L (ref 1–33)
ANION GAP SERPL CALCULATED.3IONS-SCNC: 10.4 MMOL/L (ref 5–15)
AST SERPL-CCNC: 14 U/L (ref 1–32)
BASOPHILS # BLD AUTO: 0.05 10*3/MM3 (ref 0–0.2)
BASOPHILS NFR BLD AUTO: 1 % (ref 0–1.5)
BILIRUB SERPL-MCNC: 0.6 MG/DL (ref 0–1.2)
BUN SERPL-MCNC: 13 MG/DL (ref 8–23)
BUN/CREAT SERPL: 13.8 (ref 7–25)
CALCIUM SPEC-SCNC: 10.2 MG/DL (ref 8.6–10.5)
CHLORIDE SERPL-SCNC: 104 MMOL/L (ref 98–107)
CHOLEST SERPL-MCNC: 177 MG/DL (ref 0–200)
CO2 SERPL-SCNC: 26.6 MMOL/L (ref 22–29)
CREAT SERPL-MCNC: 0.94 MG/DL (ref 0.57–1)
DEPRECATED RDW RBC AUTO: 42.8 FL (ref 37–54)
EGFRCR SERPLBLD CKD-EPI 2021: 67.9 ML/MIN/1.73
EOSINOPHIL # BLD AUTO: 0.11 10*3/MM3 (ref 0–0.4)
EOSINOPHIL NFR BLD AUTO: 2.1 % (ref 0.3–6.2)
ERYTHROCYTE [DISTWIDTH] IN BLOOD BY AUTOMATED COUNT: 13.1 % (ref 12.3–15.4)
GLOBULIN UR ELPH-MCNC: 2.4 GM/DL
GLUCOSE SERPL-MCNC: 107 MG/DL (ref 65–99)
HCT VFR BLD AUTO: 39.3 % (ref 34–46.6)
HDLC SERPL-MCNC: 40 MG/DL (ref 40–60)
HGB BLD-MCNC: 12.9 G/DL (ref 12–15.9)
IMM GRANULOCYTES # BLD AUTO: 0.01 10*3/MM3 (ref 0–0.05)
IMM GRANULOCYTES NFR BLD AUTO: 0.2 % (ref 0–0.5)
LDLC SERPL CALC-MCNC: 108 MG/DL (ref 0–100)
LDLC/HDLC SERPL: 2.61 {RATIO}
LYMPHOCYTES # BLD AUTO: 1.63 10*3/MM3 (ref 0.7–3.1)
LYMPHOCYTES NFR BLD AUTO: 31.7 % (ref 19.6–45.3)
MCH RBC QN AUTO: 29.2 PG (ref 26.6–33)
MCHC RBC AUTO-ENTMCNC: 32.8 G/DL (ref 31.5–35.7)
MCV RBC AUTO: 88.9 FL (ref 79–97)
MONOCYTES # BLD AUTO: 0.32 10*3/MM3 (ref 0.1–0.9)
MONOCYTES NFR BLD AUTO: 6.2 % (ref 5–12)
NEUTROPHILS NFR BLD AUTO: 3.02 10*3/MM3 (ref 1.7–7)
NEUTROPHILS NFR BLD AUTO: 58.8 % (ref 42.7–76)
NRBC BLD AUTO-RTO: 0.2 /100 WBC (ref 0–0.2)
PLATELET # BLD AUTO: 265 10*3/MM3 (ref 140–450)
PMV BLD AUTO: 10.9 FL (ref 6–12)
POTASSIUM SERPL-SCNC: 4.3 MMOL/L (ref 3.5–5.2)
PROT SERPL-MCNC: 7.1 G/DL (ref 6–8.5)
RBC # BLD AUTO: 4.42 10*6/MM3 (ref 3.77–5.28)
SODIUM SERPL-SCNC: 141 MMOL/L (ref 136–145)
TRIGL SERPL-MCNC: 164 MG/DL (ref 0–150)
TSH SERPL DL<=0.05 MIU/L-ACNC: 3.31 UIU/ML (ref 0.27–4.2)
VLDLC SERPL-MCNC: 29 MG/DL (ref 5–40)
WBC NRBC COR # BLD: 5.14 10*3/MM3 (ref 3.4–10.8)

## 2023-04-04 PROCEDURE — 85025 COMPLETE CBC W/AUTO DIFF WBC: CPT | Performed by: NURSE PRACTITIONER

## 2023-04-04 PROCEDURE — 80053 COMPREHEN METABOLIC PANEL: CPT | Performed by: NURSE PRACTITIONER

## 2023-04-04 PROCEDURE — 84443 ASSAY THYROID STIM HORMONE: CPT | Performed by: NURSE PRACTITIONER

## 2023-04-04 PROCEDURE — 80177 DRUG SCRN QUAN LEVETIRACETAM: CPT | Performed by: NURSE PRACTITIONER

## 2023-04-04 PROCEDURE — 80061 LIPID PANEL: CPT | Performed by: NURSE PRACTITIONER

## 2023-04-04 PROCEDURE — 36415 COLL VENOUS BLD VENIPUNCTURE: CPT | Performed by: NURSE PRACTITIONER

## 2023-04-04 RX ORDER — DOCUSATE SODIUM 100 MG/1
100 CAPSULE, LIQUID FILLED ORAL 2 TIMES DAILY
Qty: 180 CAPSULE | Refills: 1 | Status: SHIPPED | OUTPATIENT
Start: 2023-04-04

## 2023-04-04 NOTE — PATIENT INSTRUCTIONS
Suicidal Feelings: How to Help Yourself  Suicide is when you end your own life. Suicidal ideation includes expressing thoughts about, or a preoccupation with, ending your own life. There are many things you can do to help yourself feel better when struggling with these feelings. Many services and people are available to support you and others who struggle with similar feelings.  If you ever feel like you may hurt yourself or others, or have thoughts about taking your own life, get help right away. To get help:  • Go to your nearest emergency department.  • Call your local emergency services (911 in the [place]  • Call the [facility] and human services helpline (211 in the [place]  • Call or text a suicide hotline to speak with a trained counselor. The following suicide hotlines are available in the [place]:  ? 5-508-373-TALK (1-926.696.8425 or 727 in the [place]  ? 3-691-KCJOJDH (1-745.622.4386).  ? Text 951265. This is the Crisis Text Line in the [place]  ? 1-505.275.8645. This is a hotline for [place] speakers.  ? 1-267.409.1696. This is a hotline for TTY users.  ? 0-613-3-U-TRACY (1-689.621.2999). This is a hotline for lesbian, spencer, bisexual, transgender, or questioning youth.  ? For a list of hotlines in [place], visit suicide.org/hotlines/international/buizsv-xtznttx-xpcccjkn.html  • Contact a crisis center or a local suicide prevention center. To find a crisis center or suicide prevention center:  ? Call your local hospital, clinic, community service organization, mental health center, social service provider, or health department. Ask for help with connecting to a crisis center.  ? For a list of crisis centers in the [place], visit: suicidepreventionlifeline.org  ? For a list of crisis centers in [place], visit: suicideprevention.ca  How to help yourself feel better    • Promise yourself that you will not do anything bad or extreme when you have suicidal feelings. Remember the times you have  felt hopeful.  ? Many people have gotten through suicidal thoughts and feelings, and you can too.  ? If you have had these feelings before, remind yourself that you can get through them again.  • Let family, friends, teachers, or counselors know how you are feeling. Do not separate yourself from those who care about you and want to help you.  ? Talk with someone every day, even if you do not feel like talking to anyone or being with other people.  ? Face-to-face conversation is best to help them understand your feelings.  • Contact a mental health care provider and work with this person regularly.  • Make a safety plan that you can follow during a crisis.  ? Include phone numbers of suicide prevention hotlines, mental health professionals, and trusted friends and family members you can call during an emergency.  ? Save these numbers on your phone.  • If you are thinking of taking a lot of medicine, give your medicine to someone who can give it to you as prescribed.  ? If you are on antidepressants and are concerned you will overdose, tell your health care provider so that he or she can give you safer medicines.  • Try to stick to your routines and follow a schedule every day. Make self-care a priority.  • Make a list of realistic goals, and cross them off when you achieve them. Accomplishments can give you a sense of worth.  • Wait until you are feeling better before doing things that you find difficult or unpleasant.  • Do things that you have always enjoyed to take your mind off your feelings.  ? Try reading a book, or listening to or playing music.  ? Spending time outside, in nature, may help you feel better.  Follow these instructions at home:    • Visit your primary health care provider every year for a physical and a mental health checkup.  • Take over-the-counter and prescription medicines only as told by your health care provider.  ? Ask your health care provider about the possible side effects of any  medicines you are taking.  ? Ask your health care provider about whether suicidal ideation is a possible side effect of any of your medicines.  • Learn about suicidal ideation and what increases the risk for the development of suicidal thoughts.  • Eat a well-balanced diet, and eat regular meals.  • Get plenty of rest.  • Exercise if you are able. Just 30 minutes of exercise each day can help you feel better.  • Keep your living space well lit.  • Do not use alcohol or drugs. Remove these substances from your home.  General recommendations  • Remove weapons, poisons, knives, and other deadly items from your home.  • Work with a mental health care provider as needed.  • When you are feeling well, write yourself a letter with tips and support that you can read when you are not feeling well.  • Remember that life's difficulties can be sorted out with help. Conditions can be treated, and you can learn behaviors and ways of thinking that will help you.  ? Work with your health care provider or counselor to learn ways of coping with your thoughts and feelings.  Where to find more information  • National Suicide Prevention Lifeline: www.suicidepreventionlifeline.org  • Hopeline: www.hopeline.PayTango  • American Foundation for Suicide Prevention: www.afsp.org  • The [employer]ov[employer] Project (for lesbian, spencer, bisexual, transgender, or questioning youth): www.thetrevorproject.org  • National [place] of Mental Health: www.nimh.nih.gov/health/topics/suicide-prevention  • Suicide Prevention Resources: afsp.org/suicide-prevention-resources  Contact a health care provider if:  • You feel as though you are a burden to others.  • You feel agitated, angry, vengeful, or have extreme mood swings.  • You have withdrawn from family and friends.  • You are frequently using drugs or alcohol.  Get help right away if:  • You are talking about suicide or wishing to die.  • You start making plans for how to commit suicide.  • You feel that you have no  reason to live.  • You start making plans for putting your affairs in order, saying goodbye, or giving your possessions away.  • You feel guilt, shame, or unbearable pain, and it seems like there is no way out.  • You are engaging in risky behaviors that could lead to death.  If you have any of these thoughts or symptoms, get help right away:  • Go to your nearest emergency department or crisis center.  • Call emergency services (911 in the [place]  • Call or text a suicide crisis helpline.  Summary  • Suicide is when you take your own life. Suicidal feelings are thoughts about ending your own life.  • Promise yourself that you will not do anything bad or extreme when you have suicidal feelings.  • Let family, friends, teachers, or counselors know how you are feeling.  • Get help right away if you start making plans for how to commit suicide.  This information is not intended to replace advice given to you by your health care provider. Make sure you discuss any questions you have with your health care provider.  Document Revised: 07/14/2022 Document Reviewed: 04/28/2022  [employer] Patient Education © 2022 [employer]      Persistent Depressive Disorder, Adult  Persistent depressive disorder (PDD) is a mental health condition that causes symptoms of low-level depression for 2 years or longer. This disorder may also be called long-term (chronic) depression or dysthymia. PDD may include episodes of major depressive disorder (MDD), which is more severe depression that lasts for about 2 weeks or longer.  PDD can affect the way you think, feel, and behave. This condition may also affect your relationships. You may be more likely to get sick if you have PDD.  What are the causes?  The exact cause of this condition is not known. PDD is most likely caused by a combination of risk factors.  What increases the risk?  The following factors may make someone more likely to develop PDD:  • A family history of depression.  • Being  female.  • Abnormally low levels of certain brain chemicals.  • Traumatic or painful events in childhood, especially abuse or the loss of a parent.  • Chronic stress caused by upsetting life experiences, troubled family relationships, or losses.  • Chronic physical illness or other mental health disorders.  • Cultural stress, such as stress from poor living conditions or discrimination.  What are the signs or symptoms?  Symptoms of this condition may occur for most of the day, and may include:  • Physical symptoms, such as:  ? Tiredness or low energy.  ? Eating or sleeping too much or too little.  ? Being restless or agitated.  ? Unexplained physical complaints.  • Mental and emotional symptoms, such as:  ? Feeling hopeless, worthless, or guilty.  ? Anxiety.  ? Trouble focusing or making decisions.  ? Low self-esteem.  ? Negative outlook.  ? Feeling irritable.  ? Being unable to have fun or feel pleasure.  • Behavioral symptoms, such as:  ? Withdrawing from others.  ? Aggressive behavior or anger.  How is this diagnosed?  This condition may be diagnosed based on:  • Your symptoms.  • Your medical history, including your mental health history. This may involve tests to evaluate your level of depression. You may be asked questions about your lifestyle, including any drug and alcohol use, and how long you have had symptoms of PDD.  • A physical exam.  • Blood tests to rule out other conditions.  PDD may be diagnosed if you have had the following symptoms:  • A depressed mood or loss of interest in things for 2 years or longer.  • Other symptoms of depression.  How is this treated?  This condition is usually treated by mental health professionals, such as psychologists, psychiatrists, psychiatric nurse practitioners, and clinical social workers. You may need more than one type of treatment. Treatment may include:  • Psychotherapy, also called talk therapy or counseling. Types of psychotherapy include:  ? Cognitive  behavioral therapy (CBT). This teaches you to recognize unhealthy feelings, thoughts, and behaviors, and to replace them with positive thoughts and actions.  ? Interpersonal therapy (IPT). This helps you improve the way you relate to and communicate with others.  ? Family therapy. This treatment involves members of your family.  • Medicines to treat anxiety and depression or to help balance chemicals in your brain that affect emotions and behaviors.  • Lifestyle changes, such as:  ? Limiting alcohol and drug use.  ? Getting regular exercise.  ? Developing a consistent sleep routine.  ? Making healthy eating choices.  A combination of psychotherapy and medicines is thought to be the most effective form of treatment.  Follow these instructions at home:  Activity    • Exercise regularly and spend time outdoors.  • Find activities that you enjoy doing, and make time to do them.  • Find healthy ways to manage stress, such as:  ? Meditation or deep breathing.  ? Spending time in nature.  ? Journaling.  • Return to your normal activities as told by your health care provider.  Alcohol and drug use  • If you drink alcohol:  ? Limit how much you have to:  - 0-1 drink a day for women who are not pregnant.  - 0-2 drinks a day for men.  ? Know how much alcohol is in a drink. In the [place] one drink equals one 12 oz bottle of beer (355 mL), one 5 oz glass of wine (148 mL), or one 1½ oz glass of hard liquor (44 mL).  ? Discuss your alcohol or drug use with your health care provider. Alcohol and drugs can affect any antidepressant medicines you are taking.  General instructions    • Take over-the-counter medicines, prescription medicines, and herbal preparations only as told by your health care provider.  • Eat a healthy diet and get plenty of sleep.  • Consider joining a support group. Your health care provider may be able to recommend one.  • Keep all follow-up visits. This is important.  Where to find more information  • National  [place] on Mental Illness: www.christoph.org  • National [place] of Mental Health: www.nimh.nih.gov  • American Psychiatric Association: www.psychiatry.org/patients-families  Contact a health care provider if:  • Your symptoms get worse.  • You develop new symptoms.  Get help right away if:  • You harm yourself.  • You have serious thoughts about hurting yourself or others.  • You see, hear, taste, smell, or feel things that are not real (hallucinate).  If you ever feel like you may hurt yourself or others, or have thoughts about taking your own life, get help right away. Go to your nearest emergency department or:  • Call your local emergency services (178 in the [place]  • Call a suicide crisis helpline, such as the National Suicide Prevention Lifeline at 1-487.860.2041 or 740 in the [place] This is open 24 hours a day in the [place]  • Text the Crisis Text Line at 202198 (in the [place]  Summary  • Persistent depressive disorder (PDD) is a mental health condition that causes symptoms of low-level depression for 2 years or longer.  • This condition is usually treated by mental health professionals. You may need more than one type of treatment. Treatment may involve psychotherapy, medicines, and lifestyle changes.  • Get help right away if you have serious thoughts about hurting yourself or others.  This information is not intended to replace advice given to you by your health care provider. Make sure you discuss any questions you have with your health care provider.  Document Revised: 07/13/2022 Document Reviewed: 04/07/2022  [employer] Patient Education © 2022 [employer]      Major Depressive Disorder, Adult  Major depressive disorder (MDD) is a mental health condition. It may also be called clinical depression or unipolar depression. MDD causes symptoms of sadness, hopelessness, and loss of interest in things. These symptoms last most of the day, almost every day, for 2 weeks. MDD can also cause physical symptoms. It can  interfere with relationships and with everyday activities, such as work, school, and activities that are usually pleasant.  MDD may be mild, moderate, or severe. It may be single-episode MDD, which happens once, or recurrent MDD, which may occur multiple times.  What are the causes?  The exact cause of this condition is not known. MDD is most likely caused by a combination of things, which may include:  • Your personality traits.  • Proctor or conditioned behaviors or thoughts or feelings that reinforce negativity.  • Any alcohol or substance misuse.  • Long-term (chronic) physical or mental health illness.  • Going through a traumatic experience or major life changes.  What increases the risk?  The following factors may make someone more likely to develop MDD:  • A family history of depression.  • Being a woman.  • Troubled family relationships.  • Abnormally low levels of certain brain chemicals.  • Traumatic or painful events in childhood, especially abuse or loss of a parent.  • A lot of stress from life experiences, such as poor living conditions or discrimination.  • Chronic physical illness or other mental health disorders.  What are the signs or symptoms?  The main symptoms of MDD usually include:  • Constant depressed or irritable mood.  • A loss of interest in things and activities.  Other symptoms include:  • Sleeping or eating too much or too little.  • Unexplained weight gain or weight loss.  • Tiredness or low energy.  • Being agitated, restless, or weak.  • Feeling hopeless, worthless, or guilty.  • Trouble thinking clearly or making decisions.  • Thoughts of suicide or thoughts of harming others.  • Isolating oneself or avoiding other people or activities.  • Trouble completing tasks, work, or any normal obligations.  Severe symptoms of this condition may include:  • Psychotic depression.This may include false beliefs, or delusions. It may also include seeing, hearing, tasting, smelling, or feeling  things that are not real (hallucinations).  • Chronic depression or persistent depressive disorder. This is low-level depression that lasts for at least 2 years.  • Melancholic depression, or feeling extremely sad and hopeless.  • Catatonic depression, which includes trouble speaking and trouble moving.  How is this diagnosed?  This condition may be diagnosed based on:  • Your symptoms.  • Your medical and mental health history. You may be asked questions about your lifestyle, including any drug and alcohol use.  • A physical exam.  • Blood tests to rule out other conditions.  MDD is confirmed if you have the following symptoms most of the day, nearly every day, in a 2-week period:  • Either a depressed mood or loss of interest.  • At least four other MDD symptoms.  How is this treated?  This condition is usually treated by mental health professionals, such as psychologists, psychiatrists, and clinical social workers. You may need more than one type of treatment. Treatment may include:  • Psychotherapy, also called talk therapy or counseling. Types of psychotherapy include:  ? Cognitive behavioral therapy (CBT). This teaches you to recognize unhealthy feelings, thoughts, and behaviors, and replace them with positive thoughts and actions.  ? Interpersonal therapy (IPT). This helps you to improve the way you communicate with others or relate to them.  ? Family therapy. This treatment includes members of your family.  • Medicines to treat anxiety and depression. These medicines help to balance the brain chemicals that affect your emotions.  • Lifestyle changes. You may be asked to:  ? Limit alcohol use and avoid drug use.  ? Get regular exercise.  ? Get plenty of sleep.  ? Make healthy eating choices.  ? Spend more time outdoors.  • Brain stimulation. This may be done if symptoms are very severe and other treatments have not worked. Examples of this treatment are electroconvulsive therapy and transcranial magnetic  stimulation.  Follow these instructions at home:  Activity  • Exercise regularly and spend time outdoors.  • Find activities that you enjoy doing, and make time to do them.  • Find healthy ways to manage stress, such as:  ? Meditation or deep breathing.  ? Spending time in nature.  ? Journaling.  • Return to your normal activities as told by your health care provider. Ask your health care provider what activities are safe for you.  Alcohol and drug use  • If you drink alcohol:  ? Limit how much you use to:  - 0-1 drink a day for women who are not pregnant.  - 0-2 drinks a day for men.  ? Be aware of how much alcohol is in your drink. In the [place] one drink equals one 12 oz bottle of beer (355 mL), one 5 oz glass of wine (148 mL), or one 1½ oz glass of hard liquor (44 mL).  ? Discuss your alcohol use with your health care provider. Alcohol can affect any antidepressant medicines you are taking.  • Discuss any drug use with your health care provider.  General instructions    • Take over-the-counter and prescription medicines only as told by your health care provider.  • Eat a healthy diet and get plenty of sleep.  • Consider joining a support group. Your health care provider may be able to recommend one.  • Keep all follow-up visits as told by your health care provider. This is important.  Where to find more information  • National [place] on Mental Illness: www.christoph.org  • [place] National [place] of Mental Health: www.nimh.nih.gov  Contact a health care provider if:  • Your symptoms get worse.  • You develop new symptoms.  Get help right away if:  • You self-harm.  • You have serious thoughts about hurting yourself or others.  • You hallucinate.  If you ever feel like you may hurt yourself or others, or have thoughts about taking your own life, get help right away. Go to your nearest emergency department or:  • Call your local emergency services (911 in the [place]  • Call a suicide crisis helpline, such as the  National Suicide Prevention Lifeline at 1-324.755.4130 or 988 in the [place] This is open 24 hours a day in the [place]  • Text the Crisis Text Line at 946171 (in the [place]  Summary  • Major depressive disorder (MDD) is a mental health condition. MDD causes symptoms of sadness, hopelessness, and loss of interest in things. These symptoms last most of the day, almost every day, for 2 weeks.  • The symptoms of MDD can interfere with relationships and with everyday activities.  • Treatments and support are available for people who develop MDD. You may need more than one type of treatment.  • Get help right away if you have serious thoughts about hurting yourself or others.  This information is not intended to replace advice given to you by your health care provider. Make sure you discuss any questions you have with your health care provider.  Document Revised: 07/13/2022 Document Reviewed: 11/28/2020  [employer] Patient Education © 2022 [employer]

## 2023-04-04 NOTE — PROGRESS NOTES
"Subjective   Marilynn Beavers is a 62 y.o. female.   Chief Complaint   Patient presents with   • Dizziness   • Chills       History of Present Illness   Patient is here with complaint of dizziness and chills; states she feels pressure in her head. States she fell about a month ago, \"busted her mouth\", then a couple of weeks ago fell again.  Scheduling was not able to reach her for neuro appt.   Feels dizzy everyday.   Has sensation that starts in her feet and goes to her head, feels like her body is shaking, mother who is here with her has not seen this  Has thoughts she would be better off dead, denies suicide ideation, referred to Behavioral Health, has not scheduled.  Denies recent alcohol or drug use.  Taking medications as prescribed  The following portions of the patient's history were reviewed and updated as appropriate: allergies, current medications, past family history, past medical history, past social history, past surgical history and problem list.    Review of Systems   Constitutional: Negative for chills, fatigue, fever and unexpected weight change.   Respiratory: Negative for shortness of breath and wheezing.    Cardiovascular: Positive for palpitations ( with panic attacks). Negative for chest pain.   Gastrointestinal: Positive for abdominal pain (occ) and constipation (BM every 2-3 days). Negative for diarrhea.   Endocrine: Positive for cold intolerance.   Neurological: Positive for dizziness, tremors and headaches (occ in back of head).   Psychiatric/Behavioral: Positive for hallucinations (auditory and visual).       Objective   Physical Exam  Vitals reviewed.   Constitutional:       General: She is not in acute distress.     Appearance: Normal appearance. She is not ill-appearing, toxic-appearing or diaphoretic.   HENT:      Head: Normocephalic and atraumatic.      Right Ear: Tympanic membrane normal.      Left Ear: Tympanic membrane normal.   Cardiovascular:      Rate and Rhythm: Normal rate and " "regular rhythm.      Heart sounds: Normal heart sounds.   Pulmonary:      Effort: Pulmonary effort is normal. No respiratory distress.      Breath sounds: Normal breath sounds.   Neurological:      Mental Status: She is alert and oriented to person, place, and time.      Motor: Weakness present.      Comments: Residual from previous stroke, able to raise right eye brow only   Psychiatric:         Mood and Affect: Mood normal.         Thought Content: Thought content normal.       /80 (BP Location: Left arm, Patient Position: Sitting, Cuff Size: Adult)   Pulse 78   Temp 97.9 °F (36.6 °C) (Temporal)   Ht 163.8 cm (64.49\")   Wt 80.6 kg (177 lb 9.6 oz)   SpO2 98%   BMI 30.03 kg/m²     Assessment & Plan   Diagnoses and all orders for this visit:    1. Dizziness (Primary)  -     CBC Auto Differential; Future  -     CBC Auto Differential    2. Chronic idiopathic constipation  -     docusate sodium (Colace) 100 MG capsule; Take 1 capsule by mouth 2 (Two) Times a Day.  Dispense: 180 capsule; Refill: 1    3. Hard stool  -     docusate sodium (Colace) 100 MG capsule; Take 1 capsule by mouth 2 (Two) Times a Day.  Dispense: 180 capsule; Refill: 1    4. Frontal lobe epilepsy  -     Levetiracetam Level (Keppra); Future  -     Levetiracetam Level (Keppra)  -     levETIRAcetam (KEPPRA) 500 MG tablet; Take 1 tablet by mouth Every 12 (Twelve) Hours.  Dispense: 60 tablet; Refill: 1    5. Primary hypertension  -     Comprehensive Metabolic Panel; Future  -     Comprehensive Metabolic Panel  -     Cancel: Comprehensive Metabolic Panel    6. Therapeutic drug monitoring  -     Levetiracetam Level (Keppra); Future  -     Levetiracetam Level (Keppra)    7. Lipid screening  -     Lipid Panel    8. Thyroid disorder screening  -     TSH Rfx On Abnormal To Free T4    9. Mixed hyperlipidemia  -     rosuvastatin (Crestor) 10 MG tablet; Take 1 tablet by mouth Every Night.  Dispense: 90 tablet; Refill: 1        Provided patient with " contact information to schedule with neurology for dizziness and seizures.  Keppra dose was decreased in early Feb 2023 to 750 BID, will recheck Keppra level today  Screening labs ordered to evaluate chronic conditions. I will contact patient regarding test results and provide instructions regarding any necessary changes in plan of care.  Statin was started in Feb, will check lipid and liver enzymes  Nutrition and activity goals reviewed including: mainly water to drink, limit white flour/processed sugar, and processed foods, choose fresh fruits, vegetables, fish, lean meats,high fiber carbs, exercise  working toward 150 mins cardio per week, weight training 2x/week.

## 2023-04-06 LAB — LEVETIRACETAM SERPL-MCNC: 52.6 UG/ML (ref 10–40)

## 2023-04-08 DIAGNOSIS — G40.802 FRONTAL LOBE EPILEPSY: ICD-10-CM

## 2023-04-08 RX ORDER — LEVETIRACETAM 500 MG/1
TABLET ORAL
Qty: 180 TABLET | OUTPATIENT
Start: 2023-04-08

## 2023-04-08 RX ORDER — LEVETIRACETAM 500 MG/1
500 TABLET ORAL EVERY 12 HOURS
Qty: 60 TABLET | Refills: 1 | Status: SHIPPED | OUTPATIENT
Start: 2023-04-08

## 2023-04-08 RX ORDER — ROSUVASTATIN CALCIUM 10 MG/1
10 TABLET, COATED ORAL NIGHTLY
Qty: 90 TABLET | Refills: 1 | Status: SHIPPED | OUTPATIENT
Start: 2023-04-08

## 2023-04-08 NOTE — TELEPHONE ENCOUNTER
Rx Refill Note  Requested Prescriptions     Pending Prescriptions Disp Refills   • levETIRAcetam (KEPPRA) 500 MG tablet [Pharmacy Med Name: LEVETIRACETAM 500MG TABLETS] 180 tablet      Sig: TAKE 1 TABLET BY MOUTH EVERY 12 HOURS      Last office visit with prescribing clinician: 4/4/2023   Last telemedicine visit with prescribing clinician: 5/1/2023   Next office visit with prescribing clinician: 5/1/2023                         Would you like a call back once the refill request has been completed: [] Yes [] No    If the office needs to give you a call back, can they leave a voicemail: [] Yes [] No    Esther Vergara MA  04/08/23, 11:53 EDT

## 2023-04-17 ENCOUNTER — HOSPITAL ENCOUNTER (OUTPATIENT)
Dept: MAMMOGRAPHY | Facility: HOSPITAL | Age: 63
Discharge: HOME | End: 2023-04-17
Admitting: NURSE PRACTITIONER
Payer: MEDICARE

## 2023-04-17 DIAGNOSIS — Z12.31 VISIT FOR SCREENING MAMMOGRAM: ICD-10-CM

## 2023-04-17 PROCEDURE — 77063 BREAST TOMOSYNTHESIS BI: CPT

## 2023-04-17 PROCEDURE — 77067 SCR MAMMO BI INCL CAD: CPT

## 2023-04-26 ENCOUNTER — OFFICE VISIT (OUTPATIENT)
Dept: NEUROLOGY | Facility: CLINIC | Age: 63
End: 2023-04-26
Payer: MEDICARE

## 2023-04-26 VITALS
BODY MASS INDEX: 30.22 KG/M2 | DIASTOLIC BLOOD PRESSURE: 88 MMHG | HEART RATE: 81 BPM | HEIGHT: 64 IN | OXYGEN SATURATION: 98 % | WEIGHT: 177 LBS | SYSTOLIC BLOOD PRESSURE: 112 MMHG

## 2023-04-26 DIAGNOSIS — I10 PRIMARY HYPERTENSION: ICD-10-CM

## 2023-04-26 DIAGNOSIS — G40.109 SEIZURE, TEMPORAL LOBE: Primary | ICD-10-CM

## 2023-04-26 DIAGNOSIS — G43.C0 PERIODIC HEADACHE SYNDROME, NOT INTRACTABLE: ICD-10-CM

## 2023-04-26 PROCEDURE — 3074F SYST BP LT 130 MM HG: CPT | Performed by: PSYCHIATRY & NEUROLOGY

## 2023-04-26 PROCEDURE — 99204 OFFICE O/P NEW MOD 45 MIN: CPT | Performed by: PSYCHIATRY & NEUROLOGY

## 2023-04-26 PROCEDURE — 1160F RVW MEDS BY RX/DR IN RCRD: CPT | Performed by: PSYCHIATRY & NEUROLOGY

## 2023-04-26 PROCEDURE — 1159F MED LIST DOCD IN RCRD: CPT | Performed by: PSYCHIATRY & NEUROLOGY

## 2023-04-26 PROCEDURE — 3079F DIAST BP 80-89 MM HG: CPT | Performed by: PSYCHIATRY & NEUROLOGY

## 2023-04-26 RX ORDER — LOSARTAN POTASSIUM 25 MG/1
25 TABLET ORAL DAILY
Qty: 30 TABLET | Refills: 5 | Status: SHIPPED | OUTPATIENT
Start: 2023-04-26

## 2023-04-26 RX ORDER — TOPIRAMATE 25 MG/1
TABLET ORAL
Qty: 120 TABLET | Refills: 3 | Status: SHIPPED | OUTPATIENT
Start: 2023-04-26

## 2023-04-26 NOTE — PROGRESS NOTES
"Subjective   Patient ID: Marilynn Beavers is a 62 y.o. female     Chief Complaint   Patient presents with   • Seizures   • Memory Loss        History of Present Illness    62 y.o. female referred by Fatuma DOS SANTOS for seizures.     Last sz 2/6/22.      4/4/23 LVT level 52.6     Aura:  Perfume smell, dizzy ness  Semiology:  Speech arrest, R eye deviation to GTC sz     AED:  PHT, LVT, VPA, phenobarb, CBZ     HA are daily,  Located over R forehead.  Quality is throbbing.  Moderate to severe intensity.  Constant.      Tremors in hands.      LH with standing.    Reviewed medical records:    Previously followed by Dr Kaur at [facility].      \Admitted [facility] 2/6/22 - 2/18/22 for status epilepticus.      Pt found down with R eye deviation. Unresponsive.  Sedated and intubated.      HCT/CTA - NCS     cEEG - This video EEG is abnormal due to intermittent generalized delta and slow background consistent with diffuse cerebral dysfunction. There are no seizures or epileptiform discharges. Excessive beta activity may be due to   use of some medications such as benzodiazepines       AED:  LVT    2001 surgery for epilepsy.   Sz free for over 20 years.    Excessive ETOH consumption.     9/12/2001 brain biopsy - \"cavernous malformation, brain biopsy cusa contents, brain biopsy left temporal lobe    MRI Brain, 2/8/22 previous L temporal encephalomalacia     Past Medical History:   Diagnosis Date   • Anxiety    • Depression    • Hypertension    • Low back pain    • Memory impairment     since stroke   • Seizures    • Stroke 2001    during brain surgery for seizures     Family History   Problem Relation Age of Onset   • Hypertension Mother    • Arthritis Mother    • Breast cancer Neg Hx    • Ovarian cancer Neg Hx      Social History     Socioeconomic History   • Marital status:    Tobacco Use   • Smoking status: Former     Packs/day: 0.25     Years: 0.50     Pack years: 0.13     Types: Cigarettes     Start date: 1/1/2001     " "Quit date: 2010     Years since quittin.3     Passive exposure: Never   • Smokeless tobacco: Never   • Tobacco comments:     does not know how long or how much she smoked   Vaping Use   • Vaping Use: Every day   • Substances: CBD   • Devices: Disposable, Pre-filled or refillable cartridge, Pre-filled pod   Substance and Sexual Activity   • Alcohol use: Not Currently   • Drug use: Never   • Sexual activity: Defer       Review of Systems   Constitutional: Positive for fatigue. Negative for activity change and unexpected weight change.   HENT: Negative for tinnitus and trouble swallowing.    Eyes: Negative for photophobia and visual disturbance.   Respiratory: Negative for apnea, cough and choking.    Cardiovascular: Negative for leg swelling.   Gastrointestinal: Negative for nausea and vomiting.   Endocrine: Negative for cold intolerance and heat intolerance.   Genitourinary: Negative for difficulty urinating, frequency, menstrual problem and urgency.   Musculoskeletal: Negative for back pain, gait problem, myalgias and neck pain.   Skin: Negative for color change and rash.   Allergic/Immunologic: Negative for immunocompromised state.   Neurological: Positive for seizures. Negative for dizziness, tremors, syncope, facial asymmetry, speech difficulty, weakness, light-headedness, numbness and headaches.   Hematological: Negative for adenopathy. Does not bruise/bleed easily.   Psychiatric/Behavioral: Positive for decreased concentration and dysphoric mood. Negative for behavioral problems, confusion, hallucinations and sleep disturbance. The patient is nervous/anxious.        Objective     Vitals:    23 0951   BP: 112/88   Pulse: 81   SpO2: 98%   Weight: 80.3 kg (177 lb)   Height: 163.8 cm (64.49\")       Neurologic Exam     Mental Status   Oriented to person, place, and time.   Speech: speech is normal   Level of consciousness: alert  Knowledge: good and consistent with education.   Normal comprehension. "     Cranial Nerves   Cranial nerves II through XII intact.     CN II   Visual fields full to confrontation.   Visual acuity: normal  Right visual field deficit: none  Left visual field deficit: none     CN III, IV, VI   Pupils are equal, round, and reactive to light.  Extraocular motions are normal.   Nystagmus: none   Diplopia: none  Ophthalmoparesis: none  Upgaze: normal  Downgaze: normal  Conjugate gaze: present    CN V   Facial sensation intact.   Right corneal reflex: normal  Left corneal reflex: normal    CN VII   Right facial weakness: none  Left facial weakness: none    CN VIII   Hearing: intact    CN IX, X   Palate: symmetric  Right gag reflex: normal  Left gag reflex: normal    CN XI   Right sternocleidomastoid strength: normal  Left sternocleidomastoid strength: normal    CN XII   Tongue: not atrophic  Fasciculations: absent  Tongue deviation: none    Motor Exam   Muscle bulk: normal  Overall muscle tone: normal    Strength   Strength 5/5 throughout.     Sensory Exam   Light touch normal.     Gait, Coordination, and Reflexes     Gait  Gait: normal    Tremor   Resting tremor: absent  Intention tremor: absent  Action tremor: left arm and right arm    Reflexes   Reflexes 2+ except as noted.       Physical Exam  Eyes:      Extraocular Movements: EOM normal.      Pupils: Pupils are equal, round, and reactive to light.   Neurological:      Mental Status: She is oriented to person, place, and time.      Cranial Nerves: Cranial nerves 2-12 are intact.      Motor: Motor strength is normal.      Gait: Gait is intact.   Psychiatric:         Speech: Speech normal.         Office Visit on 04/04/2023   Component Date Value Ref Range Status   • Glucose 04/04/2023 107 (H)  65 - 99 mg/dL Final   • BUN 04/04/2023 13  8 - 23 mg/dL Final   • Creatinine 04/04/2023 0.94  0.57 - 1.00 mg/dL Final   • Sodium 04/04/2023 141  136 - 145 mmol/L Final   • Potassium 04/04/2023 4.3  3.5 - 5.2 mmol/L Final   • Chloride 04/04/2023 104  98  - 107 mmol/L Final   • CO2 04/04/2023 26.6  22.0 - 29.0 mmol/L Final   • Calcium 04/04/2023 10.2  8.6 - 10.5 mg/dL Final   • Total Protein 04/04/2023 7.1  6.0 - 8.5 g/dL Final   • Albumin 04/04/2023 4.7  3.5 - 5.2 g/dL Final   • ALT (SGPT) 04/04/2023 10  1 - 33 U/L Final   • AST (SGOT) 04/04/2023 14  1 - 32 U/L Final   • Alkaline Phosphatase 04/04/2023 80  39 - 117 U/L Final   • Total Bilirubin 04/04/2023 0.6  0.0 - 1.2 mg/dL Final   • Globulin 04/04/2023 2.4  gm/dL Final   • A/G Ratio 04/04/2023 2.0  g/dL Final   • BUN/Creatinine Ratio 04/04/2023 13.8  7.0 - 25.0 Final   • Anion Gap 04/04/2023 10.4  5.0 - 15.0 mmol/L Final   • eGFR 04/04/2023 67.9  >60.0 mL/min/1.73 Final   • Total Cholesterol 04/04/2023 177  0 - 200 mg/dL Final   • Triglycerides 04/04/2023 164 (H)  0 - 150 mg/dL Final   • HDL Cholesterol 04/04/2023 40  40 - 60 mg/dL Final   • LDL Cholesterol  04/04/2023 108 (H)  0 - 100 mg/dL Final   • VLDL Cholesterol 04/04/2023 29  5 - 40 mg/dL Final   • LDL/HDL Ratio 04/04/2023 2.61   Final   • TSH 04/04/2023 3.310  0.270 - 4.200 uIU/mL Final   • Levetiracetam 04/04/2023 52.6 (H)  10.0 - 40.0 ug/mL Final   • WBC 04/04/2023 5.14  3.40 - 10.80 10*3/mm3 Final   • RBC 04/04/2023 4.42  3.77 - 5.28 10*6/mm3 Final   • Hemoglobin 04/04/2023 12.9  12.0 - 15.9 g/dL Final   • Hematocrit 04/04/2023 39.3  34.0 - 46.6 % Final   • MCV 04/04/2023 88.9  79.0 - 97.0 fL Final   • MCH 04/04/2023 29.2  26.6 - 33.0 pg Final   • MCHC 04/04/2023 32.8  31.5 - 35.7 g/dL Final   • RDW 04/04/2023 13.1  12.3 - 15.4 % Final   • RDW-SD 04/04/2023 42.8  37.0 - 54.0 fl Final   • MPV 04/04/2023 10.9  6.0 - 12.0 fL Final   • Platelets 04/04/2023 265  140 - 450 10*3/mm3 Final   • Neutrophil % 04/04/2023 58.8  42.7 - 76.0 % Final   • Lymphocyte % 04/04/2023 31.7  19.6 - 45.3 % Final   • Monocyte % 04/04/2023 6.2  5.0 - 12.0 % Final   • Eosinophil % 04/04/2023 2.1  0.3 - 6.2 % Final   • Basophil % 04/04/2023 1.0  0.0 - 1.5 % Final   • Immature  Grans % 04/04/2023 0.2  0.0 - 0.5 % Final   • Neutrophils, Absolute 04/04/2023 3.02  1.70 - 7.00 10*3/mm3 Final   • Lymphocytes, Absolute 04/04/2023 1.63  0.70 - 3.10 10*3/mm3 Final   • Monocytes, Absolute 04/04/2023 0.32  0.10 - 0.90 10*3/mm3 Final   • Eosinophils, Absolute 04/04/2023 0.11  0.00 - 0.40 10*3/mm3 Final   • Basophils, Absolute 04/04/2023 0.05  0.00 - 0.20 10*3/mm3 Final   • Immature Grans, Absolute 04/04/2023 0.01  0.00 - 0.05 10*3/mm3 Final   • nRBC 04/04/2023 0.2  0.0 - 0.2 /100 WBC Final         Assessment & Plan     Problem List Items Addressed This Visit        Cardiac and Vasculature    Primary hypertension    Relevant Medications    losartan (COZAAR) 25 MG tablet       Neuro    Seizure, temporal lobe - Primary (Chronic)    Current Assessment & Plan     Continue Keppra          Relevant Medications    levETIRAcetam (KEPPRA) 500 MG tablet    topiramate (Topamax) 25 MG tablet    Periodic headache syndrome, not intractable (Chronic)    Current Assessment & Plan     Headaches are worsening.  Medication changes per orders.     Start TPM              Relevant Medications    traZODone (DESYREL) 50 MG tablet    DULoxetine (CYMBALTA) 30 MG capsule    meloxicam (Mobic) 7.5 MG tablet    levETIRAcetam (KEPPRA) 500 MG tablet    topiramate (Topamax) 25 MG tablet          No follow-ups on file.

## 2023-05-01 ENCOUNTER — OFFICE VISIT (OUTPATIENT)
Dept: FAMILY MEDICINE CLINIC | Facility: CLINIC | Age: 63
End: 2023-05-01
Payer: MEDICARE

## 2023-05-01 ENCOUNTER — HOSPITAL ENCOUNTER (OUTPATIENT)
Dept: GENERAL RADIOLOGY | Facility: HOSPITAL | Age: 63
Discharge: HOME | End: 2023-05-01
Admitting: NURSE PRACTITIONER
Payer: MEDICARE

## 2023-05-01 VITALS
TEMPERATURE: 97.8 F | SYSTOLIC BLOOD PRESSURE: 124 MMHG | HEART RATE: 97 BPM | HEIGHT: 64 IN | WEIGHT: 177.8 LBS | BODY MASS INDEX: 30.35 KG/M2 | DIASTOLIC BLOOD PRESSURE: 80 MMHG | OXYGEN SATURATION: 100 %

## 2023-05-01 DIAGNOSIS — M54.41 CHRONIC BILATERAL LOW BACK PAIN WITH BILATERAL SCIATICA: ICD-10-CM

## 2023-05-01 DIAGNOSIS — G89.29 CHRONIC PAIN OF BOTH HIPS: ICD-10-CM

## 2023-05-01 DIAGNOSIS — G40.109 SEIZURE, TEMPORAL LOBE: Chronic | ICD-10-CM

## 2023-05-01 DIAGNOSIS — G43.C0 PERIODIC HEADACHE SYNDROME, NOT INTRACTABLE: Chronic | ICD-10-CM

## 2023-05-01 DIAGNOSIS — M54.42 CHRONIC BILATERAL LOW BACK PAIN WITH BILATERAL SCIATICA: ICD-10-CM

## 2023-05-01 DIAGNOSIS — Z78.0 MENOPAUSE: ICD-10-CM

## 2023-05-01 DIAGNOSIS — M25.552 CHRONIC PAIN OF BOTH HIPS: ICD-10-CM

## 2023-05-01 DIAGNOSIS — M25.551 CHRONIC PAIN OF BOTH HIPS: ICD-10-CM

## 2023-05-01 DIAGNOSIS — G89.29 CHRONIC BILATERAL LOW BACK PAIN WITH BILATERAL SCIATICA: ICD-10-CM

## 2023-05-01 DIAGNOSIS — F33.1 MODERATE EPISODE OF RECURRENT MAJOR DEPRESSIVE DISORDER: ICD-10-CM

## 2023-05-01 DIAGNOSIS — I10 PRIMARY HYPERTENSION: ICD-10-CM

## 2023-05-01 DIAGNOSIS — K59.09 CHRONIC CONSTIPATION: ICD-10-CM

## 2023-05-01 DIAGNOSIS — M72.2 PLANTAR FASCIAL FIBROMATOSIS OF BOTH FEET: ICD-10-CM

## 2023-05-01 DIAGNOSIS — R73.03 PREDIABETES: Primary | ICD-10-CM

## 2023-05-01 LAB
EXPIRATION DATE: NORMAL
HBA1C MFR BLD: 5.7 %
Lab: NORMAL

## 2023-05-01 PROCEDURE — 73521 X-RAY EXAM HIPS BI 2 VIEWS: CPT

## 2023-05-01 PROCEDURE — 72100 X-RAY EXAM L-S SPINE 2/3 VWS: CPT

## 2023-05-01 RX ORDER — KETOROLAC TROMETHAMINE 30 MG/ML
30 INJECTION, SOLUTION INTRAMUSCULAR; INTRAVENOUS EVERY 6 HOURS PRN
Status: SHIPPED | OUTPATIENT
Start: 2023-05-01 | End: 2023-05-06

## 2023-05-01 RX ORDER — POLYETHYLENE GLYCOL 3350 17 G/17G
17 POWDER, FOR SOLUTION ORAL DAILY
Qty: 100 EACH | Refills: 1 | Status: SHIPPED | OUTPATIENT
Start: 2023-05-01

## 2023-05-01 RX ADMIN — KETOROLAC TROMETHAMINE 30 MG: 30 INJECTION, SOLUTION INTRAMUSCULAR; INTRAVENOUS at 16:14

## 2023-05-01 NOTE — PROGRESS NOTES
Subjective   Marilynn Beavers is a 62 y.o. female.   Chief Complaint   Patient presents with   • Hypertension   • Back Pain       History of Present Illness   Patient is here for follow-up for prediabetes, hypertension, back pain.  Saw Dr. Sales, neurologist, he added topamax for headaches, continued Keppra 500 mg BID, lowered losartan to 25 mg, thinking maybe that was part of the dizziness  Patient states she is having a bad day today, tremors, memory issues, tearful.   Has not scheduled with behavioral health yet,   Back pain worse when trying to get out of bed.   Walking more, walking dog, and not eating as much.  The following portions of the patient's history were reviewed and updated as appropriate: allergies, current medications, past family history, past medical history, past social history, past surgical history and problem list.    Review of Systems   Constitutional: Positive for fatigue. Negative for chills, fever and unexpected weight change.   Respiratory: Negative for shortness of breath and wheezing.    Cardiovascular: Positive for palpitations ( with panic attacks). Negative for chest pain.   Gastrointestinal: Positive for abdominal pain (occ) and constipation (BM every 2-3 days). Negative for diarrhea.   Endocrine: Positive for cold intolerance.   Musculoskeletal: Positive for back pain.   Neurological: Positive for dizziness, tremors and headaches.   Psychiatric/Behavioral: Positive for hallucinations (auditory and visual).       Objective   Physical Exam  Vitals reviewed.   Constitutional:       General: She is not in acute distress.     Appearance: Normal appearance. She is not ill-appearing, toxic-appearing or diaphoretic.   Neck:      Vascular: No carotid bruit.   Cardiovascular:      Rate and Rhythm: Normal rate and regular rhythm.      Heart sounds: Normal heart sounds.   Pulmonary:      Effort: Pulmonary effort is normal. No respiratory distress.      Breath sounds: Normal breath sounds.  "  Abdominal:      Palpations: Abdomen is soft.   Musculoskeletal:         General: Tenderness (left trochanter ) present.        Feet:       Comments: Positive straight leg raise bilaterally   Feet:      Comments: Bilateral plantar fibromas  Neurological:      Mental Status: She is alert and oriented to person, place, and time.   Psychiatric:         Mood and Affect: Mood is depressed. Affect is tearful.         Speech: Speech normal.         Behavior: Behavior normal.         Thought Content: Thought content normal.         Judgment: Judgment normal.       Results for orders placed or performed in visit on 05/01/23   POC Glycosylated Hemoglobin (Hb A1C)    Specimen: Blood   Result Value Ref Range    Hemoglobin A1C 5.7 %    Lot Number [id number]     Expiration Date 1/24/25        /80 (BP Location: Right arm, Patient Position: Sitting, Cuff Size: Adult)   Pulse 97   Temp 97.8 °F (36.6 °C) (Temporal)   Ht 163.8 cm (64.49\")   Wt 80.6 kg (177 lb 12.8 oz)   SpO2 100%   BMI 30.06 kg/m²     Assessment & Plan   Diagnoses and all orders for this visit:    1. Prediabetes (Primary)  -     POC Glycosylated Hemoglobin (Hb A1C)    2. Primary hypertension    3. Chronic constipation  -     polyethylene glycol (MIRALAX) 17 g packet; Take 17 g by mouth Daily.  Dispense: 100 each; Refill: 1    4. Chronic bilateral low back pain with bilateral sciatica  -     XR Spine Lumbar 2 or 3 View; Future  -     ketorolac (TORADOL) injection 30 mg    5. Chronic pain of both hips  -     XR Hips Bilateral With or Without Pelvis 2 View; Future  -     ketorolac (TORADOL) injection 30 mg    6. Plantar fascial fibromatosis of both feet    7. Moderate episode of recurrent major depressive disorder    8. Seizure, temporal lobe    9. Periodic headache syndrome, not intractable    Prediabetes is stable, A1c 5.7  Hypertension is controlled continue current dose of losartan  Patient encouraged to monitor blood pressure at home goal less than " 130/80 and without dizziness  X-rays ordered to evaluate low back pain and hip pain, Toradol injection given in office today for pain continue meloxicam and acetaminophen up to 3000 mg daily  Encouraged patient to schedule visit with behavioral health, depression is still active.  Denies current thoughts of harming self or others  Consider referral to orthopedic surgery for possible cortisone injections in plantar fibromas of both feet, may benefit from orthotic inserts.  Followed by neurology for seizures and headaches.  We will continue Topamax and Keppra as prescribed  Patient was encouraged to keep me informed of any acute changes, lack of improvement, or any new concerning symptoms.

## 2023-05-01 NOTE — PATIENT INSTRUCTIONS
Schedule with behavioral Health    Monitor blood pressure at home at least every other day    Take tylenol up to 3000 mg a day

## 2023-05-03 DIAGNOSIS — M54.41 CHRONIC BILATERAL LOW BACK PAIN WITH BILATERAL SCIATICA: ICD-10-CM

## 2023-05-03 DIAGNOSIS — G40.802 FRONTAL LOBE EPILEPSY: ICD-10-CM

## 2023-05-03 DIAGNOSIS — G89.29 CHRONIC BILATERAL LOW BACK PAIN WITH BILATERAL SCIATICA: ICD-10-CM

## 2023-05-03 DIAGNOSIS — M54.42 CHRONIC BILATERAL LOW BACK PAIN WITH BILATERAL SCIATICA: ICD-10-CM

## 2023-05-03 DIAGNOSIS — F51.01 PRIMARY INSOMNIA: ICD-10-CM

## 2023-05-03 RX ORDER — LEVETIRACETAM 500 MG/1
TABLET ORAL
Qty: 90 TABLET | Refills: 1 | Status: SHIPPED | OUTPATIENT
Start: 2023-05-03

## 2023-05-03 RX ORDER — MELOXICAM 7.5 MG/1
7.5 TABLET ORAL DAILY
Qty: 90 TABLET | Refills: 1 | Status: SHIPPED | OUTPATIENT
Start: 2023-05-03

## 2023-05-03 RX ORDER — TRAZODONE HYDROCHLORIDE 50 MG/1
50 TABLET ORAL DAILY
Qty: 90 TABLET | Refills: 1 | Status: SHIPPED | OUTPATIENT
Start: 2023-05-03

## 2023-05-03 NOTE — TELEPHONE ENCOUNTER
Rx Refill Note  Requested Prescriptions     Pending Prescriptions Disp Refills   • traZODone (DESYREL) 50 MG tablet [Pharmacy Med Name: TRAZODONE 50MG TABLETS] 90 tablet 1     Sig: TAKE 1 TABLET BY MOUTH DAILY   • meloxicam (MOBIC) 7.5 MG tablet [Pharmacy Med Name: MELOXICAM 7.5MG TABLETS] 90 tablet 1     Sig: TAKE 1 TABLET BY MOUTH DAILY   • levETIRAcetam (KEPPRA) 500 MG tablet [Pharmacy Med Name: LEVETIRACETAM 500MG TABLETS] 60 tablet 1     Sig: TAKE 1 TABLET BY MOUTH EVERY 12 HOURS      Last office visit with prescribing clinician: 5/1/2023   Last telemedicine visit with prescribing clinician: 8/1/2023   Next office visit with prescribing clinician: 8/1/2023                         Would you like a call back once the refill request has been completed: [] Yes [] No    If the office needs to give you a call back, can they leave a voicemail: [] Yes [] No    April CHRISTINA Lopez  05/03/23, 08:36 EDT

## 2023-05-23 ENCOUNTER — HOSPITAL ENCOUNTER (OUTPATIENT)
Dept: BONE DENSITY | Facility: HOSPITAL | Age: 63
Discharge: HOME | End: 2023-05-23
Admitting: NURSE PRACTITIONER
Payer: MEDICARE

## 2023-05-23 DIAGNOSIS — Z78.0 MENOPAUSE: ICD-10-CM

## 2023-05-23 PROCEDURE — 77080 DXA BONE DENSITY AXIAL: CPT

## 2023-08-01 ENCOUNTER — OFFICE VISIT (OUTPATIENT)
Dept: FAMILY MEDICINE CLINIC | Facility: CLINIC | Age: 63
End: 2023-08-01
Payer: MEDICARE

## 2023-08-01 VITALS
WEIGHT: 162.4 LBS | HEART RATE: 82 BPM | HEIGHT: 64 IN | DIASTOLIC BLOOD PRESSURE: 82 MMHG | BODY MASS INDEX: 27.72 KG/M2 | SYSTOLIC BLOOD PRESSURE: 118 MMHG | TEMPERATURE: 98.4 F | OXYGEN SATURATION: 98 %

## 2023-08-01 DIAGNOSIS — M16.0 PRIMARY OSTEOARTHRITIS OF BOTH HIPS: ICD-10-CM

## 2023-08-01 DIAGNOSIS — G89.29 CHRONIC BILATERAL LOW BACK PAIN WITH BILATERAL SCIATICA: ICD-10-CM

## 2023-08-01 DIAGNOSIS — M25.552 CHRONIC PAIN OF BOTH HIPS: ICD-10-CM

## 2023-08-01 DIAGNOSIS — M25.551 CHRONIC PAIN OF BOTH HIPS: ICD-10-CM

## 2023-08-01 DIAGNOSIS — G89.29 CHRONIC PAIN OF BOTH HIPS: ICD-10-CM

## 2023-08-01 DIAGNOSIS — M54.41 CHRONIC BILATERAL LOW BACK PAIN WITH BILATERAL SCIATICA: ICD-10-CM

## 2023-08-01 DIAGNOSIS — R73.03 PREDIABETES: Primary | ICD-10-CM

## 2023-08-01 DIAGNOSIS — Z51.81 THERAPEUTIC DRUG MONITORING: ICD-10-CM

## 2023-08-01 DIAGNOSIS — M54.42 CHRONIC BILATERAL LOW BACK PAIN WITH BILATERAL SCIATICA: ICD-10-CM

## 2023-08-01 LAB
EXPIRATION DATE: NORMAL
HBA1C MFR BLD: 5.5 %
Lab: NORMAL

## 2023-08-08 LAB
6MAM SAL CFM-MCNC: NOT DETECTED NG/ML
6MAM SAL QL CFM: NEGATIVE
ALPRAZ SAL CFM-MCNC: NOT DETECTED NG/ML
AMPHET SAL CFM-MCNC: NOT DETECTED NG/ML
AMPHET SAL QL CFM: NEGATIVE
ANTICONVULSANTS SAL QL CFM: NEGATIVE
BENZODIAZ SAL QL CFM: NEGATIVE
BUPRENORPHINE SAL CFM-MCNC: NOT DETECTED NG/ML
BUPRENORPHINE SAL QL CFM: NEGATIVE
BZE SAL CFM-MCNC: NOT DETECTED NG/ML
CANNABINOIDS SAL QL SCN: ABNORMAL
CARBOXYTHC SAL CFM-MCNC: 20.8 NG/ML
CARISOPRODOL SAL CFM-MCNC: NOT DETECTED NG/ML
CLONAZEPAM SAL CFM-MCNC: NOT DETECTED NG/ML
COC+MET SAL QL CFM: NEGATIVE
COCAINE SAL CFM-MCNC: NOT DETECTED NG/ML
CODEINE SAL CFM-MCNC: NOT DETECTED NG/ML
COTININE SAL CFM-MCNC: NOT DETECTED NG/ML
COTININE SAL QL CFM: NEGATIVE
CYCLOBENZAPRINE SAL CFM-MCNC: NOT DETECTED NG/ML
DHC SAL CFM-MCNC: NOT DETECTED NG/ML
DIAZEPAM SAL CFM-MCNC: NOT DETECTED NG/ML
DULOXETINE SAL CFM-MCNC: 106.8 NG/ML
DULOXETINE SAL QL CFM: ABNORMAL
EDDP SAL QL CFM: NOT DETECTED NG/ML
FENTANYL SAL CFM-MCNC: NEGATIVE NG/ML
FENTANYL SAL QL SCN: NOT DETECTED NG/ML
FLUNITRAZEPAM SAL CFM-MCNC: NOT DETECTED NG/ML
FLURAZEPAM SAL CFM-MCNC: NOT DETECTED NG/ML
GABAPENTIN SAL CFM-MCNC: NOT DETECTED UG/ML
HYDROCODONE SAL CFM-MCNC: NOT DETECTED NG/ML
HYDROMORPHONE SAL CFM-MCNC: NOT DETECTED NG/ML
HYPNOTICS SAL QL CFM: NEGATIVE
LORAZEPAM SAL-MCNC: NOT DETECTED NG/ML
MDMA SAL CFM-MCNC: NOT DETECTED NG/ML
MEPERIDINE SAL CFM-MCNC: NOT DETECTED NG/ML
MEPROBAMATE SAL CFM-MCNC: NOT DETECTED NG/ML
METHADONE SAL CFM-MCNC: NOT DETECTED NG/ML
METHADONE SAL QL CFM: NEGATIVE
METHAMPHET SAL CFM-MCNC: NOT DETECTED NG/ML
MIDAZOLAM SAL CFM-MCNC: NOT DETECTED NG/ML
MORPHINE SAL CFM-MCNC: NOT DETECTED NG/ML
MUSCLE RELAXANTS: NEGATIVE
NARCOTICS SAL QL CFM: ABNORMAL
NORBUPRENORPHINE SAL CFM-MCNC: NOT DETECTED NG/ML
NORDIAZEPAM SAL-MCNC: NOT DETECTED NG/ML
NORHYDROCODONE SAL CFM-MCNC: NOT DETECTED NG/ML
NOROXYCODONE SAL CFM-MCNC: NOT DETECTED NG/ML
OPIATES SAL QL CFM: NEGATIVE
OXAZEPAM SAL CFM-MCNC: NOT DETECTED NG/ML
OXYCODONE SAL CFM-MCNC: NOT DETECTED NG/ML
OXYCODONE SAL QL CFM: NEGATIVE
OXYMORPHONE SAL CFM-MCNC: NOT DETECTED NG/ML
PCP SAL CFM-MCNC: NOT DETECTED NG/ML
PCP SAL QL CFM: NEGATIVE
PREGABALIN SAL CFM-MCNC: NOT DETECTED UG/ML
PROPOXYPH SAL CFM-MCNC: NOT DETECTED NG/ML
TAPENTADOL SAL CFM-MCNC: NOT DETECTED NG/ML
TAPENTADOL SAL QL SCN: NEGATIVE
TEMAZEPAM SAL CFM-MCNC: NOT DETECTED NG/ML
TRAMADOL SAL CFM-MCNC: 739 NG/ML
TRIAZOLAM SAL CFM-MCNC: NOT DETECTED NG/ML
ZOLPIDEM SAL CFM-MCNC: NOT DETECTED NG/ML

## 2023-08-28 ENCOUNTER — OFFICE VISIT (OUTPATIENT)
Dept: NEUROLOGY | Facility: CLINIC | Age: 63
End: 2023-08-28
Payer: MEDICARE

## 2023-08-28 VITALS
OXYGEN SATURATION: 97 % | HEART RATE: 76 BPM | BODY MASS INDEX: 27.14 KG/M2 | SYSTOLIC BLOOD PRESSURE: 120 MMHG | DIASTOLIC BLOOD PRESSURE: 80 MMHG | HEIGHT: 64 IN | WEIGHT: 159 LBS

## 2023-08-28 DIAGNOSIS — G40.109 SEIZURE, TEMPORAL LOBE: Primary | Chronic | ICD-10-CM

## 2023-08-28 DIAGNOSIS — G43.C0 PERIODIC HEADACHE SYNDROME, NOT INTRACTABLE: Chronic | ICD-10-CM

## 2023-08-28 DIAGNOSIS — G40.802 FRONTAL LOBE EPILEPSY: ICD-10-CM

## 2023-08-28 PROCEDURE — 1160F RVW MEDS BY RX/DR IN RCRD: CPT | Performed by: PSYCHIATRY & NEUROLOGY

## 2023-08-28 PROCEDURE — 99214 OFFICE O/P EST MOD 30 MIN: CPT | Performed by: PSYCHIATRY & NEUROLOGY

## 2023-08-28 PROCEDURE — 3079F DIAST BP 80-89 MM HG: CPT | Performed by: PSYCHIATRY & NEUROLOGY

## 2023-08-28 PROCEDURE — 3074F SYST BP LT 130 MM HG: CPT | Performed by: PSYCHIATRY & NEUROLOGY

## 2023-08-28 PROCEDURE — 1159F MED LIST DOCD IN RCRD: CPT | Performed by: PSYCHIATRY & NEUROLOGY

## 2023-08-28 RX ORDER — TOPIRAMATE 100 MG/1
100 TABLET, FILM COATED ORAL 2 TIMES DAILY
Qty: 180 TABLET | Refills: 6 | Status: SHIPPED | OUTPATIENT
Start: 2023-08-28

## 2023-08-28 RX ORDER — LEVETIRACETAM 500 MG/1
500 TABLET ORAL 2 TIMES DAILY
Qty: 180 TABLET | Refills: 3 | Status: SHIPPED | OUTPATIENT
Start: 2023-08-28 | End: 2024-08-27

## 2023-08-28 NOTE — PROGRESS NOTES
"Chief Complaint  Seizures    Subjective        Marilynn Beavers presents to [facility] NEUROLOGY  History of Present Illness    62 y.o. female returns in follow up.  Last visit on 4/26/23 continued LVT, rx TPM.    Last sz 2/6/22.       4/4/23 LVT level 52.6      Aura:  Perfume smell, dizzy ness  Semiology:  Speech arrest, R eye deviation to GTC sz      AED:  PHT, LVT, VPA, phenobarb, CBZ      HA are daily,  Located over R forehead.  Quality is throbbing.  Moderate to severe intensity.  Constant.       Tremors in hands.       LH with standing.      Reviewed medical records:     Previously followed by Dr Kaur at [facility].       \Admitted [facility] 2/6/22 - 2/18/22 for status epilepticus.       Pt found down with R eye deviation. Unresponsive.  Sedated and intubated.       HCT/CTA - NCS      cEEG - This video EEG is abnormal due to intermittent generalized delta and slow background consistent with diffuse cerebral dysfunction. There are no seizures or epileptiform discharges. Excessive beta activity may be due to   use of some medications such as benzodiazepines        AED:  LVT     2001 surgery for epilepsy.   Sz free for over 20 years.    Excessive ETOH consumption.      9/12/2001 brain biopsy - \"cavernous malformation, brain biopsy cusa contents, brain biopsy left temporal lobe     MRI Brain, 2/8/22 previous L temporal encephalomalacia           Objective   Vital Signs:  /80   Pulse 76   Ht 163.8 cm (64.49\")   Wt 72.1 kg (159 lb)   SpO2 97%   BMI 26.88 kg/mý   Estimated body mass index is 26.88 kg/mý as calculated from the following:    Height as of this encounter: 163.8 cm (64.49\").    Weight as of this encounter: 72.1 kg (159 lb).           Neurologic Exam     Mental Status   Oriented to person, place, and time.   Speech: speech is normal   Level of consciousness: alert  Knowledge: good and consistent with education.   Normal comprehension.     Cranial Nerves   Cranial nerves II through XII " intact.     CN II   Visual fields full to confrontation.   Visual acuity: normal  Right visual field deficit: none  Left visual field deficit: none     CN III, IV, VI   Pupils are equal, round, and reactive to light.  Extraocular motions are normal.   Nystagmus: none   Diplopia: none  Ophthalmoparesis: none  Upgaze: normal  Downgaze: normal  Conjugate gaze: present    CN V   Facial sensation intact.   Right corneal reflex: normal  Left corneal reflex: normal    CN VII   Right facial weakness: none  Left facial weakness: none    CN VIII   Hearing: intact    CN IX, X   Palate: symmetric  Right gag reflex: normal  Left gag reflex: normal    CN XI   Right sternocleidomastoid strength: normal  Left sternocleidomastoid strength: normal    CN XII   Tongue: not atrophic  Fasciculations: absent  Tongue deviation: none    Motor Exam   Muscle bulk: normal  Overall muscle tone: normal    Strength   Strength 5/5 throughout.     Sensory Exam   Light touch normal.     Gait, Coordination, and Reflexes     Gait  Gait: normal    Tremor   Resting tremor: absent  Intention tremor: absent  Action tremor: absent    Reflexes   Reflexes 2+ except as noted.    Physical Exam  Eyes:      Extraocular Movements: EOM normal.      Pupils: Pupils are equal, round, and reactive to light.   Neurological:      Mental Status: She is oriented to person, place, and time.      Cranial Nerves: Cranial nerves 2-12 are intact.      Motor: Motor strength is normal.      Gait: Gait is intact.   Psychiatric:         Speech: Speech normal.      Result Review :  The following data was reviewed by: Hilton Sales MD on 08/28/2023:  Common labs          4/4/2023    10:45 5/1/2023    15:57 8/1/2023    15:59   Common Labs   Glucose 107      BUN 13      Creatinine 0.94      Sodium 141      Potassium 4.3      Chloride 104      Calcium 10.2      Albumin 4.7      Total Bilirubin 0.6      Alkaline Phosphatase 80      AST (SGOT) 14      ALT (SGPT) 10      WBC 5.14       Hemoglobin 12.9      Hematocrit 39.3      Platelets 265      Total Cholesterol 177      Triglycerides 164      HDL Cholesterol 40      LDL Cholesterol  108      Hemoglobin A1C  5.7  5.5                   Assessment and Plan   Diagnoses and all orders for this visit:    1. Seizure, temporal lobe (Primary)  Assessment & Plan:  Sz free on Keppra and TPM      2. Periodic headache syndrome, not intractable  Assessment & Plan:  Headaches are improving with treatment.  Medication changes per orders.    Increase  mg BID           3. Frontal lobe epilepsy  -     levETIRAcetam (KEPPRA) 500 MG tablet; Take 1 tablet by mouth 2 (Two) Times a Day.  Dispense: 180 tablet; Refill: 3    Other orders  -     topiramate (TOPAMAX) 100 MG tablet; Take 1 tablet by mouth 2 (Two) Times a Day.  Dispense: 180 tablet; Refill: 6             Follow Up   No follow-ups on file.  Patient was given instructions and counseling regarding her condition or for health maintenance advice. Please see specific information pulled into the AVS if appropriate.

## 2023-08-29 ENCOUNTER — PRIOR AUTHORIZATION (OUTPATIENT)
Dept: FAMILY MEDICINE CLINIC | Facility: CLINIC | Age: 63
End: 2023-08-29
Payer: MEDICARE

## 2023-08-29 DIAGNOSIS — F41.8 DEPRESSION WITH ANXIETY: ICD-10-CM

## 2023-08-29 RX ORDER — DULOXETIN HYDROCHLORIDE 30 MG/1
90 CAPSULE, DELAYED RELEASE ORAL DAILY
Qty: 270 CAPSULE | Refills: 1 | Status: SHIPPED | OUTPATIENT
Start: 2023-08-29

## 2023-08-29 NOTE — TELEPHONE ENCOUNTER
Rx Refill Note  Requested Prescriptions     Pending Prescriptions Disp Refills    DULoxetine (CYMBALTA) 30 MG capsule [Pharmacy Med Name: DULOXETINE DR 30MG CAPSULES] 270 capsule 1     Sig: TAKE 3 CAPSULES BY MOUTH DAILY      Last office visit with prescribing clinician: 8/1/2023   Last telemedicine visit with prescribing clinician: Visit date not found   Next office visit with prescribing clinician: 11/13/2023                         Would you like a call back once the refill request has been completed: [] Yes [] No    If the office needs to give you a call back, can they leave a voicemail: [] Yes [] No    Esther Vergara MA  08/29/23, 10:15 EDT

## 2023-08-29 NOTE — TELEPHONE ENCOUNTER
(Key: [id number]) - [id number]  DULoxetine HCl 30MG dr capsules  Status:sent to plan  Created: August 29th, 2023 [phone]  Sent: August 29th, 2023

## 2023-09-04 DIAGNOSIS — E78.2 MIXED HYPERLIPIDEMIA: ICD-10-CM

## 2023-09-04 DIAGNOSIS — I10 PRIMARY HYPERTENSION: ICD-10-CM

## 2023-09-05 RX ORDER — ROSUVASTATIN CALCIUM 10 MG/1
10 TABLET, COATED ORAL NIGHTLY
Qty: 90 TABLET | Refills: 0 | Status: SHIPPED | OUTPATIENT
Start: 2023-09-05

## 2023-09-05 RX ORDER — LOSARTAN POTASSIUM 25 MG/1
25 TABLET ORAL DAILY
Qty: 30 TABLET | Refills: 5 | Status: SHIPPED | OUTPATIENT
Start: 2023-09-05

## 2023-09-05 NOTE — TELEPHONE ENCOUNTER
Rx Refill Note  Requested Prescriptions     Pending Prescriptions Disp Refills    losartan (COZAAR) 25 MG tablet [Pharmacy Med Name: LOSARTAN 25MG TABLETS] 30 tablet 5     Sig: TAKE 1 TABLET BY MOUTH DAILY      Last filled:   04/26/2023 w/5  Last office visit with prescribing clinician: 8/28/2023      Next office visit with prescribing clinician: 3/4/2024     Henrietta Dejesus MA  09/05/23, 09:56 EDT

## 2023-09-21 ENCOUNTER — TELEPHONE (OUTPATIENT)
Dept: FAMILY MEDICINE CLINIC | Facility: CLINIC | Age: 63
End: 2023-09-21
Payer: MEDICARE

## 2023-09-21 DIAGNOSIS — M54.9 CHRONIC BACK PAIN GREATER THAN 3 MONTHS DURATION: Primary | ICD-10-CM

## 2023-09-21 DIAGNOSIS — G89.29 CHRONIC BACK PAIN GREATER THAN 3 MONTHS DURATION: Primary | ICD-10-CM

## 2023-09-21 RX ORDER — TRAMADOL HYDROCHLORIDE 50 MG/1
50 TABLET ORAL EVERY 6 HOURS PRN
Qty: 30 TABLET | Refills: 0 | Status: SHIPPED | OUTPATIENT
Start: 2023-09-21

## 2023-09-21 NOTE — TELEPHONE ENCOUNTER
Caller: Nimesh Marilynn Matosy    Relationship: Self    Best call back number: [phone]     What medication are you requesting: TRAMADOL     What are your current symptoms: PAIN IN BACK AND SPINE    How long have you been experiencing symptoms:     Have you had these symptoms before:    [x] Yes  [] No    Have you been treated for these symptoms before:   [] Yes  [x] No    If a prescription is needed, what is your preferred pharmacy and phone number: [id number] [address]  AT [facility] - [phone] [facility] [phone] FX     Additional notes: PATIENT STATES AT HER LAST APPOINTMENT SHE DISCUSSED THIS MEDICATION WITH PILAR [facility] AND SHE WOULD LIKE TO KNOW IF SHE CAN GET THIS MEDICATION PRESCRIBED.

## 2023-11-13 ENCOUNTER — OFFICE VISIT (OUTPATIENT)
Dept: FAMILY MEDICINE CLINIC | Facility: CLINIC | Age: 63
End: 2023-11-13
Payer: MEDICARE

## 2023-11-13 VITALS
BODY MASS INDEX: 24.45 KG/M2 | HEART RATE: 80 BPM | OXYGEN SATURATION: 97 % | SYSTOLIC BLOOD PRESSURE: 114 MMHG | TEMPERATURE: 98.4 F | HEIGHT: 64 IN | WEIGHT: 143.2 LBS | DIASTOLIC BLOOD PRESSURE: 78 MMHG

## 2023-11-13 DIAGNOSIS — G40.109 SEIZURE, TEMPORAL LOBE: Chronic | ICD-10-CM

## 2023-11-13 DIAGNOSIS — Z23 NEED FOR PNEUMOCOCCAL VACCINATION: ICD-10-CM

## 2023-11-13 DIAGNOSIS — E55.9 VITAMIN D DEFICIENCY: ICD-10-CM

## 2023-11-13 DIAGNOSIS — R41.3 MEMORY IMPAIRMENT: ICD-10-CM

## 2023-11-13 DIAGNOSIS — Z13.29 THYROID DISORDER SCREENING: ICD-10-CM

## 2023-11-13 DIAGNOSIS — G43.C0 PERIODIC HEADACHE SYNDROME, NOT INTRACTABLE: Chronic | ICD-10-CM

## 2023-11-13 DIAGNOSIS — F41.8 DEPRESSION WITH ANXIETY: ICD-10-CM

## 2023-11-13 DIAGNOSIS — Z23 FLU VACCINE NEED: ICD-10-CM

## 2023-11-13 DIAGNOSIS — G89.29 CHRONIC BACK PAIN GREATER THAN 3 MONTHS DURATION: ICD-10-CM

## 2023-11-13 DIAGNOSIS — R73.03 PREDIABETES: ICD-10-CM

## 2023-11-13 DIAGNOSIS — Z13.0 SCREENING FOR DEFICIENCY ANEMIA: ICD-10-CM

## 2023-11-13 DIAGNOSIS — F10.10 ALCOHOL ABUSE: ICD-10-CM

## 2023-11-13 DIAGNOSIS — M54.9 CHRONIC BACK PAIN GREATER THAN 3 MONTHS DURATION: ICD-10-CM

## 2023-11-13 DIAGNOSIS — Z00.00 MEDICARE ANNUAL WELLNESS VISIT, SUBSEQUENT: Primary | ICD-10-CM

## 2023-11-13 DIAGNOSIS — K59.04 CHRONIC IDIOPATHIC CONSTIPATION: ICD-10-CM

## 2023-11-13 DIAGNOSIS — E78.5 DYSLIPIDEMIA: ICD-10-CM

## 2023-11-13 DIAGNOSIS — I10 PRIMARY HYPERTENSION: ICD-10-CM

## 2023-11-13 LAB
EXPIRATION DATE: NORMAL
HBA1C MFR BLD: 5.4 % (ref 4.5–5.7)
Lab: NORMAL

## 2023-11-13 RX ORDER — TRAMADOL HYDROCHLORIDE 50 MG/1
50 TABLET ORAL DAILY PRN
Qty: 30 TABLET | Refills: 0 | Status: SHIPPED | OUTPATIENT
Start: 2023-11-13

## 2023-11-13 NOTE — LETTER
[facility]  Vaccine Consent Form    Patient Name:  Marilynn Beavers  Patient :  1960     Vaccine(s) Ordered    Fluzone High-Dose 65+yrs ([id number])  Pneumococcal Conjugate Vaccine 20-Valent (PCV20)        Screening Checklist  The following questions should be completed prior to vaccination. If you answer “yes” to any question, it does not necessarily mean you should not be vaccinated. It just means we may need to clarify or ask more questions. If a question is unclear, please ask your healthcare provider to explain it.    Yes No   Any fever or moderate to severe illness today (mild illness and/or antibiotic treatment are not contraindications)?     Do you have a history of a serious reaction to any previous vaccinations, such as anaphylaxis, encephalopathy within 7 days, Guillain-Bureau syndrome within 6 weeks, seizure?     Have you received any live vaccine(s) in the past month (MMR, BRIANA)?     Do you have an anaphylactic allergy to latex (DTaP, DTaP-IPV, Hep A, Hep B, MenB, RV, Td, Tdap), baker’s yeast (Hep B, HPV), or gelatin (BRIANA, MMR)?     Do you have an anaphylactic allergy to neomycin (Rabies, BRIANA, MMR, IPV, Hep A), polymyxin B (IPV), or streptomycin (IPV)?      Any cancer, leukemia, AIDS, or other immune system disorder? (BRIANA, MMR, RV)     Do you have a parent, brother, or sister with an immune system problem (if immune competence of vaccine recipient clinically verified, can proceed)? (MMR, BRIANA)     Any recent steroid treatments for >2 weeks, chemotherapy, or radiation treatment? (BRIANA, MMR)     Have you received antibody-containing blood transfusions or IVIG in the past 11 months (recommended interval is dependent on product)? (MMR, BRIANA)     Have you taken antiviral drugs (acyclovir, famciclovir, valacyclovir) in the last 24 or 48 hours, respectively (BRIANA)?      Are you pregnant or planning to become pregnant within 1 month? (BRIANA, MMR, HPV, IPV, MenB; For hep B- refer to Engerix-B)     For infants,  have you ever been told your child has had intussusception or a medical emergency involving obstruction of the intestine (RV)? If not for an infant, can skip this question.         *Ordering Physician/APC should be consulted if “yes” is checked by the patient or guardian above.      I have received, read, and understand the Vaccine Information Statement (VIS) for each vaccine ordered above.  I have considered my health status as well as the health status of my close contacts.  I have taken the opportunity to discuss my vaccine questions with my health care provider.   I have requested that the ordered vaccine(s) be given to me.  I understand the benefits and risks of the vaccines.  I understand that I should remain in the clinic for 15 minutes after receiving the vaccine(s).  _________________________________________________________  Signature of Patient or Parent/Legal Guardian ____________________  Date

## 2023-11-13 NOTE — PROGRESS NOTES
The ABCs of the Annual Wellness Visit  Subsequent Medicare Wellness Visit    Subjective    Marilynn Beavers is a 63 y.o. female who presents for a Subsequent Medicare Wellness Visit.  Has been working on weight loss.  States the Tramadol; is helping with her neck and back pain, is not taking everyday.    The following portions of the patient's history were reviewed and   updated as appropriate: allergies, current medications, past family history, past medical history, past social history, past surgical history, and problem list.    Compared to one year ago, the patient feels her physical   health is better. Easier to get around after losing weight and controlling pain    Compared to one year ago, the patient feels her mental   health is the same.    Recent Hospitalizations:  She was not admitted to the hospital during the last year.       Current Medical Providers:  Patient Care Team:  Fatuma Stevenson APRN as PCP - General (Nurse Practitioner)    Outpatient Medications Prior to Visit   Medication Sig Dispense Refill    ARIPiprazole (ABILIFY) 2 MG tablet TAKE 1 TABLET BY MOUTH DAILY 90 tablet 1    Cholecalciferol (D3) 50 MCG (2000 UT) tablet Take 1 tablet by mouth Daily. 90 each 3    docusate sodium (Colace) 100 MG capsule Take 1 capsule by mouth 2 (Two) Times a Day. 180 capsule 1    DULoxetine (CYMBALTA) 30 MG capsule TAKE 3 CAPSULES BY MOUTH DAILY 270 capsule 1    levETIRAcetam (KEPPRA) 500 MG tablet Take 1 tablet by mouth 2 (Two) Times a Day. 180 tablet 3    losartan (COZAAR) 25 MG tablet TAKE 1 TABLET BY MOUTH DAILY 30 tablet 5    meloxicam (MOBIC) 7.5 MG tablet TAKE 1 TABLET BY MOUTH DAILY 90 tablet 1    Multiple Vitamins-Iron (multivitamin with iron) tablet tablet Take 1 tablet by mouth Daily. 90 tablet 3    polyethylene glycol (MIRALAX) 17 g packet Take 17 g by mouth Daily. 100 each 1    rosuvastatin (CRESTOR) 10 MG tablet TAKE 1 TABLET BY MOUTH EVERY NIGHT 90 tablet 0    topiramate (TOPAMAX) 100 MG tablet  "Take 1 tablet by mouth 2 (Two) Times a Day. 180 tablet 6    traZODone (DESYREL) 50 MG tablet TAKE 1 TABLET BY MOUTH DAILY 90 tablet 1    vitamin B-12 (CYANOCOBALAMIN) 1000 MCG tablet Take 1 tablet by mouth Daily. 90 tablet 3    traMADol (ULTRAM) 50 MG tablet Take 1 tablet by mouth Every 6 (Six) Hours As Needed for Moderate Pain. 30 tablet 0     No facility-administered medications prior to visit.       Opioid medication/s are on active medication list.  and I have evaluated her active treatment plan and pain score trends (see table).  Vitals:    11/13/23 1057   PainSc:   4     I have reviewed the chart for potential of high risk medication and harmful drug interactions in the elderly.          Aspirin is not on active medication list.  Aspirin use is not indicated based on review of current medical condition/s. Risk of harm outweighs potential benefits.  .    Patient Active Problem List   Diagnosis    Memory impairment    Episode of recurrent major depressive disorder    Lumbago with sciatica    Anxiety    Alcohol abuse    Depression    Primary hypertension    Primary insomnia    Seizure, temporal lobe    Periodic headache syndrome, not intractable    Plantar fascial fibromatosis of both feet     Advance Care Planning   Advance Care Planning     Advance Directive is not on file.  ACP discussion was held with the patient during this visit. Patient does not have an advance directive, information provided.     Objective    Vitals:    11/13/23 1057   BP: 114/78   BP Location: Right arm   Patient Position: Sitting   Cuff Size: Adult   Pulse: 80   Temp: 98.4 °F (36.9 °C)   TempSrc: Oral   SpO2: 97%   Weight: 65 kg (143 lb 3.2 oz)   Height: 162.8 cm (64.09\")   PainSc:   4     Estimated body mass index is 24.51 kg/m² as calculated from the following:    Height as of this encounter: 162.8 cm (64.09\").    Weight as of this encounter: 65 kg (143 lb 3.2 oz).    BMI is within normal parameters. No other follow-up for BMI " required.      Does the patient have evidence of cognitive impairment? Yes    Lab Results   Component Value Date    HGBA1C 5.4 2023        HEALTH RISK ASSESSMENT    Smoking Status:  Social History     Tobacco Use   Smoking Status Former    Packs/day: 0.25    Years: 0.50    Additional pack years: 0.00    Total pack years: 0.13    Types: Cigarettes    Start date: 2001    Quit date: 2010    Years since quittin.8    Passive exposure: Never   Smokeless Tobacco Never   Tobacco Comments    does not know how long or how much she smoked     Alcohol Consumption:  Social History     Substance and Sexual Activity   Alcohol Use Not Currently     Fall Risk Screen:    STU Fall Risk Assessment was completed, and patient is at LOW risk for falls.Assessment completed on:2023    Depression Screenin/13/2023    11:03 AM   PHQ-2/PHQ-9 Depression Screening   Little Interest or Pleasure in Doing Things 0-->not at all   Feeling Down, Depressed or Hopeless 0-->not at all   PHQ-9: Brief Depression Severity Measure Score 0       Health Habits and Functional and Cognitive Screenin/13/2023    11:03 AM   Functional & Cognitive Status   Do you have difficulty preparing food and eating? No   Do you have difficulty bathing yourself, getting dressed or grooming yourself? No   Do you have difficulty using the toilet? No   Do you have difficulty moving around from place to place? No   Do you have trouble with steps or getting out of a bed or a chair? No   Current Diet Well Balanced Diet   Dental Exam Up to date   Eye Exam Up to date   Exercise (times per week) 7 times per week   Current Exercises Include Walking;House Cleaning   Do you need help using the phone?  No   Are you deaf or do you have serious difficulty hearing?  No   Do you need help to go to places out of walking distance? No   Do you need help shopping? No   Do you need help preparing meals?  No   Do you need help with housework?  No   Do  you need help with laundry? No   Do you need help taking your medications? No   Do you need help managing money? No   Do you ever drive or ride in a car without wearing a seat belt? No       Age-appropriate Screening Schedule:  Refer to the list below for future screening recommendations based on patient's age, sex and/or medical conditions. Orders for these recommended tests are listed in the plan section. The patient has been provided with a written plan.    Health Maintenance   Topic Date Due    TDAP/TD VACCINES (1 - Tdap) Never done    ZOSTER VACCINE (1 of 2) Never done    ANNUAL WELLNESS VISIT  05/17/2023    COVID-19 Vaccine (5 - 2023-24 season) 09/01/2023    LIPID PANEL  04/04/2024    MAMMOGRAM  04/17/2024    DXA SCAN  05/23/2025    COLORECTAL CANCER SCREENING  07/06/2032    HEPATITIS C SCREENING  Completed    INFLUENZA VACCINE  Completed    Pneumococcal Vaccine 65+  Completed                  CMS Preventative Services Quick Reference  Risk Factors Identified During Encounter  Immunizations Discussed/Encouraged: Influenza, Prevnar 20 (Pneumococcal 20-valent conjugate), Shingrix, COVID19, and RSV (Respiratory Syncytial Virus)  The above risks/problems have been discussed with the patient.  Pertinent information has been shared with the patient in the After Visit Summary.  An After Visit Summary and PPPS were made available to the patient.    Follow Up:   Next Medicare Wellness visit to be scheduled in 1 year.       Additional E&M Note during same encounter follows:  Patient has multiple medical problems which are significant and separately identifiable that require additional work above and beyond the Medicare Wellness Visit.      Chief Complaint  Medicare Wellness-subsequent and Hip Pain (left)    Subjective        HPI      Review of Systems   Constitutional:  Negative for appetite change, chills, diaphoresis, fatigue and fever.   HENT:  Positive for postnasal drip and tinnitus (right).    Eyes:  Positive for  "visual disturbance (occ blurry). Negative for photophobia, pain and redness.   Respiratory:  Negative for cough, shortness of breath and wheezing.    Cardiovascular:  Negative for chest pain, palpitations and leg swelling.   Gastrointestinal:  Positive for constipation (hard stools). Negative for abdominal pain, blood in stool, diarrhea (occ), nausea and vomiting.   Endocrine: Negative for cold intolerance, heat intolerance, polydipsia and polyuria.   Genitourinary:  Negative for difficulty urinating, dysuria and vaginal bleeding (hysterectomy).   Musculoskeletal:  Positive for arthralgias, back pain, neck pain and neck stiffness.   Skin:  Negative for color change, pallor, rash and wound.   Allergic/Immunologic: Positive for environmental allergies.   Neurological:  Positive for dizziness (if moves too quickly), tremors (occ hands) and seizures (last in Feb 2022). Negative for numbness.   Psychiatric/Behavioral:  Positive for dysphoric mood (controlled), hallucinations (rare visual) and sleep disturbance (trazaodone). Negative for self-injury and suicidal ideas. The patient is nervous/anxious (controlled).        Objective   Vital Signs:  /78 (BP Location: Right arm, Patient Position: Sitting, Cuff Size: Adult)   Pulse 80   Temp 98.4 °F (36.9 °C) (Oral)   Ht 162.8 cm (64.09\")   Wt 65 kg (143 lb 3.2 oz)   SpO2 97%   BMI 24.51 kg/m²     Physical Exam  Vitals and nursing note reviewed.   Constitutional:       General: She is not in acute distress.     Appearance: Normal appearance. She is well-developed. She is not diaphoretic.   HENT:      Head: Normocephalic and atraumatic.      Right Ear: External ear normal.      Left Ear: External ear normal.      Nose: Nose normal.      Mouth/Throat:      Dentition: Has dentures.   Eyes:      General: No scleral icterus.        Right eye: No discharge.         Left eye: No discharge.      Conjunctiva/sclera: Conjunctivae normal.      Pupils: Pupils are equal, round, " and reactive to light.   Neck:      Thyroid: No thyromegaly.      Vascular: No JVD.      Trachea: No tracheal deviation.   Cardiovascular:      Rate and Rhythm: Normal rate and regular rhythm.      Pulses: Normal pulses.      Heart sounds: Normal heart sounds. No murmur heard.     No friction rub. No gallop.   Pulmonary:      Effort: Pulmonary effort is normal. No respiratory distress.      Breath sounds: Normal breath sounds. No stridor. No wheezing or rales.   Chest:      Chest wall: No tenderness.      Comments: Fibrocystic areas bilaterally  Abdominal:      General: Bowel sounds are normal. There is no distension.      Palpations: Abdomen is soft. There is no mass.      Tenderness: There is no abdominal tenderness. There is no guarding or rebound.      Hernia: No hernia is present.   Musculoskeletal:         General: No tenderness or deformity.      Cervical back: Normal range of motion and neck supple.      Right lower leg: No edema.      Left lower leg: No edema.   Lymphadenopathy:      Cervical: No cervical adenopathy.   Skin:     General: Skin is warm and dry.      Coloration: Skin is not pale.      Findings: No erythema or rash.   Neurological:      Mental Status: She is alert and oriented to person, place, and time.      Cranial Nerves: No cranial nerve deficit.      Motor: No abnormal muscle tone.      Coordination: Coordination normal.      Deep Tendon Reflexes: Reflexes are normal and symmetric. Reflexes normal.   Psychiatric:         Behavior: Behavior normal.         Thought Content: Thought content normal.         Judgment: Judgment normal.                         Assessment and Plan   Diagnoses and all orders for this visit:    1. Medicare annual wellness visit, subsequent (Primary)    2. Prediabetes  -     POC Glycosylated Hemoglobin (Hb A1C)  -     Comprehensive Metabolic Panel; Future    3. Dyslipidemia  -     Lipid Panel; Future    4. Vitamin D deficiency  -     Vitamin D,25-Hydroxy;  Future    5. Thyroid disorder screening  -     TSH Rfx On Abnormal To Free T4; Future    6. Screening for deficiency anemia  -     CBC Auto Differential; Future    7. Primary hypertension    8. Periodic headache syndrome, not intractable    9. Seizure, temporal lobe    10. Memory impairment    11. Depression with anxiety    12. Alcohol abuse    13. Chronic idiopathic constipation  -     linaclotide (Linzess) 72 MCG capsule capsule; Take 1 capsule by mouth Every Morning Before Breakfast.  Dispense: 90 capsule; Refill: 1    14. Flu vaccine need  -     Fluzone High-Dose 65+yrs (4633-7900)    15. Need for pneumococcal vaccination  -     Pneumococcal Conjugate Vaccine 20-Valent (PCV20)    16. Chronic back pain greater than 3 months duration  -     traMADol (ULTRAM) 50 MG tablet; Take 1 tablet by mouth Daily As Needed for Moderate Pain.  Dispense: 30 tablet; Refill: 0      Results for orders placed or performed in visit on 11/13/23   POC Glycosylated Hemoglobin (Hb A1C)    Specimen: Blood   Result Value Ref Range    Hemoglobin A1C 5.4 4.5 - 5.7 %    Lot Number [id number]     Expiration Date 7/30/25        Screening labs ordered to evaluate chronic conditions. I will contact patient regarding test results and provide instructions regarding any necessary changes in plan of care.    A1c in normal range, continue with lifestyle changes and healthy eating  Linzess prescribed for chronic constipation  Alcohol abuse in remission, per patient report last use was prior to hospitalization   Refilled tramadol, will repeat UDS in one month, states she has used a CBD vape, no marijuana. Advised to stop products with THC since UDS is affected  Depression and anxiety are controlled  Followed by neurology for seizure disorder and memory loss  Nutrition and activity goals reviewed including: mainly water to drink, limit white flour/processed sugar, and processed foods, choose fresh fruits, vegetables, fish, lean meats,high fiber carbs,  exercise  working toward 150 mins cardio per week, weight training 2x/week.     Adequate calcium, vitamin D and weight bearing exercise are important to reduce risk of osteoporosis. Fall prevention is also important to reduce the risk of fracture.   Reduce fall risk with regular vision checks, medication review, osteoporosis screening, strength and balance training. Remove tripping hazards, avoid standing too quickly, limit alcohol. Eat a healthy well-balanced diet, stay hydrated.             Follow Up   Return in about 4 weeks (around 12/11/2023) for Recheck UDS.  Patient was given instructions and counseling regarding her condition or for health maintenance advice. Please see specific information pulled into the AVS if appropriate.

## 2023-12-07 DIAGNOSIS — G89.29 CHRONIC BILATERAL LOW BACK PAIN WITH BILATERAL SCIATICA: ICD-10-CM

## 2023-12-07 DIAGNOSIS — M54.42 CHRONIC BILATERAL LOW BACK PAIN WITH BILATERAL SCIATICA: ICD-10-CM

## 2023-12-07 DIAGNOSIS — F51.01 PRIMARY INSOMNIA: ICD-10-CM

## 2023-12-07 DIAGNOSIS — M54.41 CHRONIC BILATERAL LOW BACK PAIN WITH BILATERAL SCIATICA: ICD-10-CM

## 2023-12-07 DIAGNOSIS — E78.2 MIXED HYPERLIPIDEMIA: ICD-10-CM

## 2023-12-07 RX ORDER — ROSUVASTATIN CALCIUM 10 MG/1
10 TABLET, COATED ORAL NIGHTLY
Qty: 90 TABLET | Refills: 0 | Status: SHIPPED | OUTPATIENT
Start: 2023-12-07

## 2023-12-07 RX ORDER — MELOXICAM 7.5 MG/1
7.5 TABLET ORAL DAILY
Qty: 90 TABLET | Refills: 1 | Status: SHIPPED | OUTPATIENT
Start: 2023-12-07

## 2023-12-07 RX ORDER — TRAZODONE HYDROCHLORIDE 50 MG/1
50 TABLET ORAL DAILY
Qty: 90 TABLET | Refills: 1 | Status: SHIPPED | OUTPATIENT
Start: 2023-12-07

## 2023-12-15 RX ORDER — TOPIRAMATE 100 MG/1
100 TABLET, FILM COATED ORAL 2 TIMES DAILY
Qty: 180 TABLET | Refills: 3 | Status: SHIPPED | OUTPATIENT
Start: 2023-12-15

## 2023-12-15 RX ORDER — TOPIRAMATE 25 MG/1
TABLET ORAL
Qty: 327 TABLET | Status: CANCELLED | OUTPATIENT
Start: 2023-12-15

## 2023-12-15 NOTE — TELEPHONE ENCOUNTER
Rx Refill Note  Requested Prescriptions     Pending Prescriptions Disp Refills    topiramate (TOPAMAX) 100 MG tablet 180 tablet 6     Sig: Take 1 tablet by mouth 2 (Two) Times a Day.      Last filled:  08/28/2023 w/6  Last office visit with prescribing clinician: 8/28/2023      Next office visit with prescribing clinician: 3/4/2024     Henrietta Dejesus MA  12/15/23, 07:00 EST

## 2023-12-18 ENCOUNTER — OFFICE VISIT (OUTPATIENT)
Dept: FAMILY MEDICINE CLINIC | Facility: CLINIC | Age: 63
End: 2023-12-18
Payer: MEDICARE

## 2023-12-18 ENCOUNTER — LAB (OUTPATIENT)
Dept: LAB | Facility: HOSPITAL | Age: 63
End: 2023-12-18
Payer: MEDICARE

## 2023-12-18 VITALS
DIASTOLIC BLOOD PRESSURE: 78 MMHG | TEMPERATURE: 98.2 F | OXYGEN SATURATION: 98 % | SYSTOLIC BLOOD PRESSURE: 114 MMHG | HEIGHT: 64 IN | WEIGHT: 139.9 LBS | HEART RATE: 80 BPM | BODY MASS INDEX: 23.88 KG/M2

## 2023-12-18 DIAGNOSIS — I10 PRIMARY HYPERTENSION: ICD-10-CM

## 2023-12-18 DIAGNOSIS — E55.9 VITAMIN D DEFICIENCY: ICD-10-CM

## 2023-12-18 DIAGNOSIS — K59.04 CHRONIC IDIOPATHIC CONSTIPATION: ICD-10-CM

## 2023-12-18 DIAGNOSIS — E78.5 DYSLIPIDEMIA: ICD-10-CM

## 2023-12-18 DIAGNOSIS — Z13.0 SCREENING FOR DEFICIENCY ANEMIA: ICD-10-CM

## 2023-12-18 DIAGNOSIS — Z51.81 THERAPEUTIC DRUG MONITORING: ICD-10-CM

## 2023-12-18 DIAGNOSIS — R73.03 PREDIABETES: ICD-10-CM

## 2023-12-18 DIAGNOSIS — Z13.29 THYROID DISORDER SCREENING: ICD-10-CM

## 2023-12-18 DIAGNOSIS — M54.9 CHRONIC BACK PAIN GREATER THAN 3 MONTHS DURATION: Primary | ICD-10-CM

## 2023-12-18 DIAGNOSIS — G89.29 CHRONIC BACK PAIN GREATER THAN 3 MONTHS DURATION: Primary | ICD-10-CM

## 2023-12-18 LAB
BASOPHILS # BLD AUTO: 0.03 10*3/MM3 (ref 0–0.2)
BASOPHILS NFR BLD AUTO: 0.6 % (ref 0–1.5)
DEPRECATED RDW RBC AUTO: 42.5 FL (ref 37–54)
EOSINOPHIL # BLD AUTO: 0.05 10*3/MM3 (ref 0–0.4)
EOSINOPHIL NFR BLD AUTO: 1.1 % (ref 0.3–6.2)
ERYTHROCYTE [DISTWIDTH] IN BLOOD BY AUTOMATED COUNT: 13.2 % (ref 12.3–15.4)
HCT VFR BLD AUTO: 40.8 % (ref 34–46.6)
HGB BLD-MCNC: 13.5 G/DL (ref 12–15.9)
IMM GRANULOCYTES # BLD AUTO: 0.01 10*3/MM3 (ref 0–0.05)
IMM GRANULOCYTES NFR BLD AUTO: 0.2 % (ref 0–0.5)
LYMPHOCYTES # BLD AUTO: 1.29 10*3/MM3 (ref 0.7–3.1)
LYMPHOCYTES NFR BLD AUTO: 27.9 % (ref 19.6–45.3)
MCH RBC QN AUTO: 29.3 PG (ref 26.6–33)
MCHC RBC AUTO-ENTMCNC: 33.1 G/DL (ref 31.5–35.7)
MCV RBC AUTO: 88.5 FL (ref 79–97)
MONOCYTES # BLD AUTO: 0.18 10*3/MM3 (ref 0.1–0.9)
MONOCYTES NFR BLD AUTO: 3.9 % (ref 5–12)
NEUTROPHILS NFR BLD AUTO: 3.07 10*3/MM3 (ref 1.7–7)
NEUTROPHILS NFR BLD AUTO: 66.3 % (ref 42.7–76)
NRBC BLD AUTO-RTO: 0 /100 WBC (ref 0–0.2)
PLATELET # BLD AUTO: 254 10*3/MM3 (ref 140–450)
PMV BLD AUTO: 12 FL (ref 6–12)
RBC # BLD AUTO: 4.61 10*6/MM3 (ref 3.77–5.28)
WBC NRBC COR # BLD AUTO: 4.63 10*3/MM3 (ref 3.4–10.8)

## 2023-12-18 PROCEDURE — 84443 ASSAY THYROID STIM HORMONE: CPT | Performed by: NURSE PRACTITIONER

## 2023-12-18 PROCEDURE — 82306 VITAMIN D 25 HYDROXY: CPT | Performed by: NURSE PRACTITIONER

## 2023-12-18 PROCEDURE — 85025 COMPLETE CBC W/AUTO DIFF WBC: CPT | Performed by: NURSE PRACTITIONER

## 2023-12-18 PROCEDURE — 80053 COMPREHEN METABOLIC PANEL: CPT | Performed by: NURSE PRACTITIONER

## 2023-12-18 PROCEDURE — 80061 LIPID PANEL: CPT | Performed by: NURSE PRACTITIONER

## 2023-12-18 PROCEDURE — G0483 DRUG TEST DEF 22+ CLASSES: HCPCS | Performed by: NURSE PRACTITIONER

## 2023-12-18 NOTE — PROGRESS NOTES
Subjective   Marilynn Beavers is a 63 y.o. female.   Chief Complaint   Patient presents with    Hypertension       Hypertension  Associated symptoms include palpitations ( with panic attacks). Pertinent negatives include no chest pain, headaches or shortness of breath.      Patient is here for follow up for chronic pain, has been taking tramadol with good benefit. We need to repeat UDS because previous positive for marijuana  Has been taking Linzess for constipation, has helped, now having a BM every other day instead of every 3 days  Followed by neurology for hx seizures; psychiatry for depression and anxiety, hx of alcohol abuse  The following portions of the patient's history were reviewed and updated as appropriate: allergies, current medications, past family history, past medical history, past social history, past surgical history, and problem list.    Review of Systems   Constitutional:  Positive for fatigue. Negative for chills, fever and unexpected weight change.   Respiratory:  Negative for shortness of breath and wheezing.    Cardiovascular:  Positive for palpitations ( with panic attacks). Negative for chest pain.   Gastrointestinal:  Positive for constipation (BM every 2-3 days). Negative for abdominal pain and diarrhea.   Endocrine: Positive for cold intolerance.   Genitourinary:  Negative for dysuria and pelvic pain.   Musculoskeletal:  Positive for arthralgias and back pain.   Neurological:  Positive for dizziness, tremors and numbness. Negative for weakness and headaches.   Psychiatric/Behavioral:  Positive for dysphoric mood and sleep disturbance. Negative for hallucinations, self-injury and suicidal ideas. The patient is nervous/anxious.        Objective   Physical Exam  Vitals reviewed.   Constitutional:       General: She is not in acute distress.     Appearance: Normal appearance. She is not ill-appearing, toxic-appearing or diaphoretic.   Cardiovascular:      Rate and Rhythm: Normal rate and  "regular rhythm.   Pulmonary:      Effort: Pulmonary effort is normal. No respiratory distress.      Breath sounds: Normal breath sounds.   Neurological:      Mental Status: She is oriented to person, place, and time.   Psychiatric:         Thought Content: Thought content normal.         Judgment: Judgment normal.       /78 (BP Location: Right arm, Patient Position: Sitting, Cuff Size: Adult)   Pulse 80   Temp 98.2 °F (36.8 °C) (Oral)   Ht 162.8 cm (64.09\")   Wt 63.5 kg (139 lb 14.4 oz)   SpO2 98%   BMI 23.94 kg/m²     Assessment & Plan   Problems Addressed this Visit          Cardiac and Vasculature    Primary hypertension     Other Visit Diagnoses       Chronic back pain greater than 3 months duration    -  Primary    Chronic idiopathic constipation        Relevant Medications    linaclotide (Linzess) 145 MCG capsule capsule          Diagnoses         Codes Comments    Chronic back pain greater than 3 months duration    -  Primary ICD-10-CM: M54.9, G89.29  ICD-9-CM: 724.5, 338.29     Primary hypertension     ICD-10-CM: I10  ICD-9-CM: 401.9     Chronic idiopathic constipation     ICD-10-CM: K59.04  ICD-9-CM: 564.00           This patient is on a controlled substance which improves symptoms/quality of life and is aware of the risks, benefits and possible side-effects current treatment. The patient denies any medication side-effects at this time. A controlled substance agreement will be obtained or is currently on file. We reviewed required monitoring for controlled substances including but not limited to quarterly follow-up visits, annual depression screening, and urine drug screens to which the patient is agreeable. A KERWIN report has been or shortly will be reviewed. There are no signs of deviation or misuse.   Reviewed policy for controlled substance prescriptions  Increased Linzess for better constipation management  HTN is well controlled  Patient was encouraged to keep me informed of any acute " changes, lack of improvement, or any new concerning symptoms.

## 2023-12-19 LAB
25(OH)D3 SERPL-MCNC: 47.3 NG/ML (ref 30–100)
ALBUMIN SERPL-MCNC: 4.3 G/DL (ref 3.5–5.2)
ALBUMIN/GLOB SERPL: 1.9 G/DL
ALP SERPL-CCNC: 62 U/L (ref 39–117)
ALT SERPL W P-5'-P-CCNC: 10 U/L (ref 1–33)
ANION GAP SERPL CALCULATED.3IONS-SCNC: 11.1 MMOL/L (ref 5–15)
AST SERPL-CCNC: 15 U/L (ref 1–32)
BILIRUB SERPL-MCNC: 0.5 MG/DL (ref 0–1.2)
BUN SERPL-MCNC: 15 MG/DL (ref 8–23)
BUN/CREAT SERPL: 16.1 (ref 7–25)
CALCIUM SPEC-SCNC: 9.8 MG/DL (ref 8.6–10.5)
CHLORIDE SERPL-SCNC: 108 MMOL/L (ref 98–107)
CHOLEST SERPL-MCNC: 211 MG/DL (ref 0–200)
CO2 SERPL-SCNC: 20.9 MMOL/L (ref 22–29)
CREAT SERPL-MCNC: 0.93 MG/DL (ref 0.57–1)
EGFRCR SERPLBLD CKD-EPI 2021: 68.3 ML/MIN/1.73
GLOBULIN UR ELPH-MCNC: 2.3 GM/DL
GLUCOSE SERPL-MCNC: 107 MG/DL (ref 65–99)
HDLC SERPL-MCNC: 48 MG/DL (ref 40–60)
LDLC SERPL CALC-MCNC: 143 MG/DL (ref 0–100)
LDLC/HDLC SERPL: 2.93 {RATIO}
POTASSIUM SERPL-SCNC: 4.3 MMOL/L (ref 3.5–5.2)
PROT SERPL-MCNC: 6.6 G/DL (ref 6–8.5)
SODIUM SERPL-SCNC: 140 MMOL/L (ref 136–145)
TRIGL SERPL-MCNC: 111 MG/DL (ref 0–150)
TSH SERPL DL<=0.05 MIU/L-ACNC: 2.2 UIU/ML (ref 0.27–4.2)
VLDLC SERPL-MCNC: 20 MG/DL (ref 5–40)

## 2023-12-25 LAB
6MAM SAL CFM-MCNC: NOT DETECTED NG/ML
6MAM SAL QL CFM: NEGATIVE
ALPRAZ SAL CFM-MCNC: NOT DETECTED NG/ML
AMPHET SAL CFM-MCNC: NOT DETECTED NG/ML
AMPHET SAL QL CFM: NEGATIVE
ANTICONVULSANTS SAL QL CFM: NEGATIVE
BENZODIAZ SAL QL CFM: NEGATIVE
BUPRENORPHINE SAL CFM-MCNC: NOT DETECTED NG/ML
BUPRENORPHINE SAL QL CFM: NEGATIVE
BZE SAL CFM-MCNC: NOT DETECTED NG/ML
CANNABINOIDS SAL QL SCN: NEGATIVE
CARBOXYTHC SAL CFM-MCNC: NOT DETECTED NG/ML
CARISOPRODOL SAL CFM-MCNC: NOT DETECTED NG/ML
CLONAZEPAM SAL CFM-MCNC: NOT DETECTED NG/ML
COC+MET SAL QL CFM: NEGATIVE
COCAINE SAL CFM-MCNC: NOT DETECTED NG/ML
CODEINE SAL CFM-MCNC: NOT DETECTED NG/ML
COTININE SAL CFM-MCNC: NOT DETECTED NG/ML
COTININE SAL QL CFM: NEGATIVE
CYCLOBENZAPRINE SAL CFM-MCNC: NOT DETECTED NG/ML
DHC SAL CFM-MCNC: NOT DETECTED NG/ML
DIAZEPAM SAL CFM-MCNC: NOT DETECTED NG/ML
DULOXETINE SAL CFM-MCNC: 19.7 NG/ML
DULOXETINE SAL QL CFM: ABNORMAL
EDDP SAL QL CFM: NOT DETECTED NG/ML
FENTANYL SAL CFM-MCNC: NEGATIVE NG/ML
FENTANYL SAL QL SCN: NOT DETECTED NG/ML
FLUNITRAZEPAM SAL CFM-MCNC: NOT DETECTED NG/ML
FLURAZEPAM SAL CFM-MCNC: NOT DETECTED NG/ML
GABAPENTIN SAL CFM-MCNC: NOT DETECTED UG/ML
HYDROCODONE SAL CFM-MCNC: NOT DETECTED NG/ML
HYDROMORPHONE SAL CFM-MCNC: NOT DETECTED NG/ML
HYPNOTICS SAL QL CFM: NEGATIVE
LORAZEPAM SAL-MCNC: NOT DETECTED NG/ML
MDMA SAL CFM-MCNC: NOT DETECTED NG/ML
MEPERIDINE SAL CFM-MCNC: NOT DETECTED NG/ML
MEPROBAMATE SAL CFM-MCNC: NOT DETECTED NG/ML
METHADONE SAL CFM-MCNC: NOT DETECTED NG/ML
METHADONE SAL QL CFM: NEGATIVE
METHAMPHET SAL CFM-MCNC: NOT DETECTED NG/ML
MIDAZOLAM SAL CFM-MCNC: NOT DETECTED NG/ML
MORPHINE SAL CFM-MCNC: NOT DETECTED NG/ML
MUSCLE RELAXANTS: NEGATIVE
NARCOTICS SAL QL CFM: NEGATIVE
NORBUPRENORPHINE SAL CFM-MCNC: NOT DETECTED NG/ML
NORDIAZEPAM SAL-MCNC: NOT DETECTED NG/ML
NORHYDROCODONE SAL CFM-MCNC: NOT DETECTED NG/ML
NOROXYCODONE SAL CFM-MCNC: NOT DETECTED NG/ML
OPIATES SAL QL CFM: NEGATIVE
OXAZEPAM SAL CFM-MCNC: NOT DETECTED NG/ML
OXYCODONE SAL CFM-MCNC: NOT DETECTED NG/ML
OXYCODONE SAL QL CFM: NEGATIVE
OXYMORPHONE SAL CFM-MCNC: NOT DETECTED NG/ML
PCP SAL CFM-MCNC: NOT DETECTED NG/ML
PCP SAL QL CFM: NEGATIVE
PREGABALIN SAL CFM-MCNC: NOT DETECTED UG/ML
PROPOXYPH SAL CFM-MCNC: NOT DETECTED NG/ML
TAPENTADOL SAL CFM-MCNC: NOT DETECTED NG/ML
TAPENTADOL SAL QL SCN: NEGATIVE
TEMAZEPAM SAL CFM-MCNC: NOT DETECTED NG/ML
TRAMADOL SAL CFM-MCNC: NOT DETECTED NG/ML
TRIAZOLAM SAL CFM-MCNC: NOT DETECTED NG/ML
ZOLPIDEM SAL CFM-MCNC: NOT DETECTED NG/ML

## 2024-01-04 DIAGNOSIS — R44.0 AUDITORY HALLUCINATIONS: ICD-10-CM

## 2024-01-04 DIAGNOSIS — F41.8 DEPRESSION WITH ANXIETY: ICD-10-CM

## 2024-01-04 DIAGNOSIS — R44.1 VISUAL HALLUCINATIONS: ICD-10-CM

## 2024-01-04 RX ORDER — ARIPIPRAZOLE 2 MG/1
2 TABLET ORAL DAILY
Qty: 90 TABLET | Refills: 1 | Status: SHIPPED | OUTPATIENT
Start: 2024-01-04

## 2024-01-04 NOTE — TELEPHONE ENCOUNTER
Rx Refill Note  Requested Prescriptions     Pending Prescriptions Disp Refills    ARIPiprazole (ABILIFY) 2 MG tablet [Pharmacy Med Name: ARIPIPRAZOLE 2MG TABLETS] 90 tablet 1     Sig: TAKE 1 TABLET BY MOUTH DAILY      Last office visit with prescribing clinician: 12/18/2023   Last telemedicine visit with prescribing clinician: Visit date not found   Next office visit with prescribing clinician: 3/18/2024       Yoana Dodd MA  01/04/24, 09:11 EST

## 2024-02-15 DIAGNOSIS — K59.04 CHRONIC IDIOPATHIC CONSTIPATION: ICD-10-CM

## 2024-02-15 RX ORDER — LINACLOTIDE 72 UG/1
72 CAPSULE, GELATIN COATED ORAL
Qty: 90 CAPSULE | Refills: 1 | Status: SHIPPED | OUTPATIENT
Start: 2024-02-15

## 2024-03-04 DIAGNOSIS — E78.2 MIXED HYPERLIPIDEMIA: ICD-10-CM

## 2024-03-04 DIAGNOSIS — I10 PRIMARY HYPERTENSION: ICD-10-CM

## 2024-03-04 RX ORDER — LOSARTAN POTASSIUM 25 MG/1
25 TABLET ORAL DAILY
Qty: 30 TABLET | Refills: 5 | Status: SHIPPED | OUTPATIENT
Start: 2024-03-04

## 2024-03-04 RX ORDER — ROSUVASTATIN CALCIUM 10 MG/1
10 TABLET, COATED ORAL NIGHTLY
Qty: 90 TABLET | Refills: 1 | Status: SHIPPED | OUTPATIENT
Start: 2024-03-04

## 2024-03-04 NOTE — TELEPHONE ENCOUNTER
"Relay     \"[facility] for patient to return call concerning appointment.  She cancelled due to insurance issue.  We show that she has [employer] PPO.  She can continue to be seen with her out of network benefits.  There could be higher out of pocket costs but we can continue to see her if she would like to reschedule.\"                "

## 2024-03-04 NOTE — TELEPHONE ENCOUNTER
Rx Refill Note  Requested Prescriptions     Pending Prescriptions Disp Refills    losartan (COZAAR) 25 MG tablet [Pharmacy Med Name: LOSARTAN 25MG TABLETS] 30 tablet 5     Sig: TAKE 1 TABLET BY MOUTH DAILY      Last filled:  09/05/2023 w/5  Last office visit with prescribing clinician: 8/28/2023      Next office visit with prescribing clinician: Visit date not found     Henrietta Dejesus MA  03/04/24, 09:57 EST

## 2024-03-18 ENCOUNTER — TELEPHONE (OUTPATIENT)
Dept: FAMILY MEDICINE CLINIC | Facility: CLINIC | Age: 64
End: 2024-03-18

## 2024-03-18 DIAGNOSIS — G89.29 CHRONIC BACK PAIN GREATER THAN 3 MONTHS DURATION: ICD-10-CM

## 2024-03-18 DIAGNOSIS — M54.9 CHRONIC BACK PAIN GREATER THAN 3 MONTHS DURATION: ICD-10-CM

## 2024-03-18 NOTE — TELEPHONE ENCOUNTER
Patient came in for an o/v with Fatuma and was told we are out of network with her insurance and we had to cancel her appointment. Patient is requesting refills on her medications. She could not specify which ones she needs.

## 2024-03-19 RX ORDER — TRAMADOL HYDROCHLORIDE 50 MG/1
50 TABLET ORAL DAILY PRN
Qty: 30 TABLET | Refills: 0 | Status: SHIPPED | OUTPATIENT
Start: 2024-03-19

## 2024-05-31 DIAGNOSIS — F51.01 PRIMARY INSOMNIA: ICD-10-CM

## 2024-05-31 RX ORDER — TRAZODONE HYDROCHLORIDE 50 MG/1
50 TABLET ORAL DAILY
Qty: 90 TABLET | Refills: 1 | Status: SHIPPED | OUTPATIENT
Start: 2024-05-31

## 2024-06-01 DIAGNOSIS — G89.29 CHRONIC BILATERAL LOW BACK PAIN WITH BILATERAL SCIATICA: ICD-10-CM

## 2024-06-01 DIAGNOSIS — M54.42 CHRONIC BILATERAL LOW BACK PAIN WITH BILATERAL SCIATICA: ICD-10-CM

## 2024-06-01 DIAGNOSIS — M54.41 CHRONIC BILATERAL LOW BACK PAIN WITH BILATERAL SCIATICA: ICD-10-CM

## 2024-06-03 RX ORDER — MELOXICAM 7.5 MG/1
7.5 TABLET ORAL DAILY
Qty: 90 TABLET | Refills: 1 | Status: SHIPPED | OUTPATIENT
Start: 2024-06-03

## 2024-06-28 DIAGNOSIS — K59.04 CHRONIC IDIOPATHIC CONSTIPATION: ICD-10-CM

## 2024-06-28 RX ORDER — LINACLOTIDE 145 UG/1
145 CAPSULE, GELATIN COATED ORAL
Qty: 90 CAPSULE | Refills: 1 | OUTPATIENT
Start: 2024-06-28

## 2024-06-28 NOTE — TELEPHONE ENCOUNTER
Rx Refill Note  Requested Prescriptions     Pending Prescriptions Disp Refills    Linzess 145 MCG capsule capsule [Pharmacy Med Name: LINZESS 145MCG CAPSULES] 90 capsule 1     Sig: TAKE 1 CAPSULE BY MOUTH EVERY MORNING BEFORE BREAKFAST      Last office visit with prescribing clinician: 12/18/2023   Last telemedicine visit with prescribing clinician: Visit date not found   Next office visit with prescribing clinician: Visit date not found     Yoana Dodd MA  06/28/24, 08:47 EDT

## 2024-07-03 DIAGNOSIS — R44.1 VISUAL HALLUCINATIONS: ICD-10-CM

## 2024-07-03 DIAGNOSIS — R44.0 AUDITORY HALLUCINATIONS: ICD-10-CM

## 2024-07-03 DIAGNOSIS — F41.8 DEPRESSION WITH ANXIETY: ICD-10-CM

## 2024-07-03 RX ORDER — ARIPIPRAZOLE 2 MG/1
2 TABLET ORAL DAILY
Qty: 90 TABLET | Refills: 1 | Status: SHIPPED | OUTPATIENT
Start: 2024-07-03

## 2024-09-03 DIAGNOSIS — G40.802 FRONTAL LOBE EPILEPSY: ICD-10-CM

## 2024-09-03 RX ORDER — LEVETIRACETAM 500 MG/1
500 TABLET ORAL 2 TIMES DAILY
Qty: 14 TABLET | Refills: 0 | Status: SHIPPED | OUTPATIENT
Start: 2024-09-03

## 2024-09-03 NOTE — TELEPHONE ENCOUNTER
Spoke to patient to make appointment.  Made next day appt but sending rx in for 7 day supply.    Rx Refill Note  Requested Prescriptions     Pending Prescriptions Disp Refills    levETIRAcetam (KEPPRA) 500 MG tablet [Pharmacy Med Name: LEVETIRACETAM 500MG TABLETS] 180 tablet 3     Sig: TAKE 1 TABLET BY MOUTH TWICE DAILY      Last filled:  08/28/2023 W/3  Last office visit with prescribing clinician: 8/28/2023      Next office visit with prescribing clinician: Visit date not found     Henrietta Dejesus MA  09/03/24, 11:49 EDT

## 2024-10-03 ENCOUNTER — TELEPHONE (OUTPATIENT)
Dept: NEUROLOGY | Facility: CLINIC | Age: 64
End: 2024-10-03
Payer: MEDICARE

## 2024-10-03 DIAGNOSIS — I10 PRIMARY HYPERTENSION: ICD-10-CM

## 2024-10-03 RX ORDER — LOSARTAN POTASSIUM 25 MG/1
25 TABLET ORAL DAILY
Qty: 30 TABLET | Refills: 5 | OUTPATIENT
Start: 2024-10-03

## 2024-10-03 NOTE — TELEPHONE ENCOUNTER
Patient cancelled f/u on 03/04/24 and no showed on 09/04/24    Rx Refill Note  Requested Prescriptions     Pending Prescriptions Disp Refills    losartan (COZAAR) 25 MG tablet 30 tablet 5     Sig: Take 1 tablet by mouth Daily.      Last filled:  03/04/24 w/5  Last office visit with prescribing clinician: 8/28/2023      Next office visit with prescribing clinician: Visit date not found     Henrietta Dejesus MA  10/03/24, 15:35 EDT

## 2024-10-07 ENCOUNTER — TELEPHONE (OUTPATIENT)
Dept: NEUROLOGY | Facility: CLINIC | Age: 64
End: 2024-10-07
Payer: MEDICARE

## 2024-10-07 DIAGNOSIS — G40.802 FRONTAL LOBE EPILEPSY: ICD-10-CM

## 2024-10-07 DIAGNOSIS — I10 PRIMARY HYPERTENSION: ICD-10-CM

## 2024-10-07 RX ORDER — LOSARTAN POTASSIUM 25 MG/1
25 TABLET ORAL DAILY
Qty: 60 TABLET | Refills: 0 | Status: SHIPPED | OUTPATIENT
Start: 2024-10-07

## 2024-10-07 RX ORDER — LEVETIRACETAM 500 MG/1
500 TABLET ORAL 2 TIMES DAILY
Qty: 120 TABLET | Refills: 0 | Status: SHIPPED | OUTPATIENT
Start: 2024-10-07

## 2024-10-07 NOTE — TELEPHONE ENCOUNTER
Received refill request for Losartan.  Called patient to schedule appointment  but she has [employer] medicare advantage.  She does not have a new neurologist as yet so I informed that we will refill for another 60 days and advised to ask her PCP to take over this prescription and her levetiracetam.  Discussed with Dr Sales before refilling.  She expressed understanding and appreciation.

## 2024-12-24 DIAGNOSIS — I10 PRIMARY HYPERTENSION: ICD-10-CM

## 2024-12-24 DIAGNOSIS — G40.802 FRONTAL LOBE EPILEPSY: ICD-10-CM

## 2024-12-26 RX ORDER — LOSARTAN POTASSIUM 25 MG/1
25 TABLET ORAL DAILY
Qty: 90 TABLET | Refills: 0 | Status: SHIPPED | OUTPATIENT
Start: 2024-12-26

## 2024-12-26 RX ORDER — LEVETIRACETAM 500 MG/1
500 TABLET ORAL 2 TIMES DAILY
Qty: 180 TABLET | Refills: 0 | Status: SHIPPED | OUTPATIENT
Start: 2024-12-26

## 2024-12-26 NOTE — TELEPHONE ENCOUNTER
At last fill, 10.03.24, I called to speak to patient to inquire if she has found a new neurologist since we no longer were in network with her insurance as of 12/31/2023.  At that time she expressed understanding that Dr abraham was going to fill her medication for a 3 month supply to give her time to schedule with a new physician.    I called this morning, when we received the new medication request, to see if she had scheduled with the new neurologist.  She stated that she had not due to being too busy and just not having time.         Rx Refill Note  Requested Prescriptions     Pending Prescriptions Disp Refills    losartan (COZAAR) 25 MG tablet [Pharmacy Med Name: LOSARTAN 25MG TABLETS] 90 tablet      Sig: TAKE 1 TABLET BY MOUTH DAILY    levETIRAcetam (KEPPRA) 500 MG tablet [Pharmacy Med Name: LEVETIRACETAM 500MG TABLETS] 180 tablet      Sig: TAKE 1 TABLET BY MOUTH TWICE DAILY      Last filled:    Last office visit with prescribing clinician: 8/28/2023      Next office visit with prescribing clinician: Visit date not found     Henrietta Dejesus MA  12/26/24, 09:43 EST

## 2025-02-26 ENCOUNTER — TRANSCRIBE ORDERS (OUTPATIENT)
Dept: ADMINISTRATIVE | Facility: HOSPITAL | Age: 65
End: 2025-02-26
Payer: MEDICARE

## 2025-02-26 DIAGNOSIS — Z12.31 ENCOUNTER FOR SCREENING MAMMOGRAM FOR MALIGNANT NEOPLASM OF BREAST: Primary | ICD-10-CM

## 2025-05-12 ENCOUNTER — TRANSCRIBE ORDERS (OUTPATIENT)
Dept: ADMINISTRATIVE | Facility: HOSPITAL | Age: 65
End: 2025-05-12
Payer: MEDICARE

## 2025-05-12 DIAGNOSIS — Z12.31 ENCOUNTER FOR SCREENING MAMMOGRAM FOR MALIGNANT NEOPLASM OF BREAST: Primary | ICD-10-CM

## 2025-05-16 LAB
NCCN CRITERIA FLAG: NORMAL
TYRER CUZICK SCORE: 3.5

## 2025-05-21 ENCOUNTER — HOSPITAL ENCOUNTER (OUTPATIENT)
Dept: MAMMOGRAPHY | Facility: HOSPITAL | Age: 65
Discharge: HOME | End: 2025-05-21
Admitting: INTERNAL MEDICINE
Payer: MEDICARE

## 2025-05-21 DIAGNOSIS — I10 PRIMARY HYPERTENSION: ICD-10-CM

## 2025-05-21 DIAGNOSIS — Z12.31 ENCOUNTER FOR SCREENING MAMMOGRAM FOR MALIGNANT NEOPLASM OF BREAST: ICD-10-CM

## 2025-05-21 PROCEDURE — 77063 BREAST TOMOSYNTHESIS BI: CPT

## 2025-05-21 PROCEDURE — 77067 SCR MAMMO BI INCL CAD: CPT

## 2025-05-21 RX ORDER — LOSARTAN POTASSIUM 25 MG/1
25 TABLET ORAL DAILY
Qty: 90 TABLET | Refills: 0 | OUTPATIENT
Start: 2025-05-21

## 2025-05-21 NOTE — TELEPHONE ENCOUNTER
Rx Refill Note  Requested Prescriptions     Pending Prescriptions Disp Refills    losartan (COZAAR) 25 MG tablet [Pharmacy Med Name: LOSARTAN 25MG TABLETS] 90 tablet 0     Sig: TAKE 1 TABLET BY MOUTH DAILY      Last filled: 12/26/24 90 with 0 refills.   Last office visit with prescribing clinician: 8/28/23    Next office visit with prescribing clinician: Visit date not found     Patient needs appointment for further refills.    Elena Salazar MA  05/21/25, 08:03 EDT